# Patient Record
Sex: FEMALE | Race: BLACK OR AFRICAN AMERICAN | Employment: FULL TIME | URBAN - METROPOLITAN AREA
[De-identification: names, ages, dates, MRNs, and addresses within clinical notes are randomized per-mention and may not be internally consistent; named-entity substitution may affect disease eponyms.]

---

## 2017-02-20 ENCOUNTER — HOSPITAL ENCOUNTER (OUTPATIENT)
Dept: MAMMOGRAPHY | Age: 42
Discharge: HOME | End: 2017-02-20
Attending: OBSTETRICS & GYNECOLOGY
Payer: COMMERCIAL

## 2017-02-20 DIAGNOSIS — Z12.31 VISIT FOR SCREENING MAMMOGRAM: ICD-10-CM

## 2017-02-20 PROCEDURE — 77067 SCR MAMMO BI INCL CAD: CPT

## 2017-04-18 ENCOUNTER — OFFICE VISIT (OUTPATIENT)
Dept: INTERNAL MEDICINE CLINIC | Age: 42
End: 2017-04-18

## 2017-04-18 VITALS
OXYGEN SATURATION: 96 % | SYSTOLIC BLOOD PRESSURE: 117 MMHG | TEMPERATURE: 98.5 F | HEART RATE: 84 BPM | BODY MASS INDEX: 33.9 KG/M2 | WEIGHT: 216 LBS | RESPIRATION RATE: 16 BRPM | DIASTOLIC BLOOD PRESSURE: 80 MMHG | HEIGHT: 67 IN

## 2017-04-18 DIAGNOSIS — E04.1 THYROID NODULE: ICD-10-CM

## 2017-04-18 DIAGNOSIS — K21.9 GASTROESOPHAGEAL REFLUX DISEASE WITHOUT ESOPHAGITIS: Primary | ICD-10-CM

## 2017-04-18 DIAGNOSIS — G43.009 MIGRAINE WITHOUT AURA AND WITHOUT STATUS MIGRAINOSUS, NOT INTRACTABLE: ICD-10-CM

## 2017-04-18 DIAGNOSIS — M41.9 SCOLIOSIS OF THORACIC SPINE, UNSPECIFIED SCOLIOSIS TYPE: ICD-10-CM

## 2017-04-18 DIAGNOSIS — E66.9 OBESITY (BMI 30-39.9): ICD-10-CM

## 2017-04-18 RX ORDER — SERTRALINE HYDROCHLORIDE 100 MG/1
TABLET, FILM COATED ORAL
Qty: 90 TAB | Refills: 11 | Status: SHIPPED | OUTPATIENT
Start: 2017-04-18 | End: 2017-04-21 | Stop reason: SDUPTHER

## 2017-04-18 RX ORDER — KETOROLAC TROMETHAMINE 10 MG/1
TABLET, FILM COATED ORAL
Refills: 0 | COMMUNITY
Start: 2017-01-30 | End: 2017-04-18 | Stop reason: ALTCHOICE

## 2017-04-18 RX ORDER — SUMATRIPTAN 50 MG/1
TABLET, FILM COATED ORAL
Refills: 3 | COMMUNITY
Start: 2017-03-30 | End: 2018-09-07 | Stop reason: SDUPTHER

## 2017-04-18 RX ORDER — TRAMADOL HYDROCHLORIDE 50 MG/1
50 TABLET ORAL
Qty: 60 TAB | Refills: 2 | Status: SHIPPED | OUTPATIENT
Start: 2017-04-18 | End: 2017-05-23

## 2017-04-18 RX ORDER — DILTIAZEM HYDROCHLORIDE 240 MG/1
CAPSULE, EXTENDED RELEASE ORAL
Refills: 3 | COMMUNITY
Start: 2017-04-03 | End: 2018-05-29

## 2017-04-18 RX ORDER — BUTALBITAL, ACETAMINOPHEN AND CAFFEINE 50; 325; 40 MG/1; MG/1; MG/1
TABLET ORAL
Refills: 0 | COMMUNITY
Start: 2017-01-30 | End: 2017-04-18 | Stop reason: ALTCHOICE

## 2017-04-18 RX ORDER — OMEPRAZOLE 40 MG/1
CAPSULE, DELAYED RELEASE ORAL
Refills: 3 | COMMUNITY
Start: 2017-04-02 | End: 2018-07-18

## 2017-04-18 RX ORDER — PHENDIMETRAZINE TARTRATE 105 MG/1
CAPSULE, EXTENDED RELEASE ORAL
Refills: 0 | COMMUNITY
Start: 2017-01-30 | End: 2017-04-18 | Stop reason: CLARIF

## 2017-04-18 NOTE — MR AVS SNAPSHOT
Visit Information Date & Time Provider Department Dept. Phone Encounter #  
 4/18/2017 11:45 AM Raj Daniel MD [facility] [phone] [id number] Follow-up Instructions Return in about 1 year (around 4/18/2018), or if symptoms worsen or fail to improve. Follow-up and Disposition History Upcoming Health Maintenance Date Due DTaP/Tdap/Td series (1 - Tdap) 7/7/1996 INFLUENZA AGE 9 TO ADULT 8/1/2016 PAP AKA CERVICAL CYTOLOGY 5/20/2017 Allergies as of 4/18/2017  Review Complete On: 4/18/2017 By: Raj Daniel MD  
  
 Severity Noted Reaction Type Reactions Pertussis Vaccines High 05/10/2010    Other (comments) Shortness of breathe Mouth Cleanser [Carbamide Peroxide]  05/10/2010    Hives  
 glyoxide Pineapple  05/10/2010    Hives Rondec [Brompheniramine-pseudoephedrin]  05/10/2010    Hives Shellfish Containing Products  05/10/2010    Hives Tetracycline  05/10/2010    Hives Current Immunizations  Reviewed on 1/17/2013 Name Date Influenza Vaccine Whole 11/6/2012 Not reviewed this visit You Were Diagnosed With   
  
 Codes Comments Gastroesophageal reflux disease without esophagitis    -  Primary ICD-10-CM: K21.9 ICD-9-CM: 530.81 Thyroid nodule     ICD-10-CM: E04.1 ICD-9-CM: 241.0 Obesity (BMI 30-39. 9)     ICD-10-CM: E66.9 ICD-9-CM: 278.00 Scoliosis of thoracic spine, unspecified scoliosis type     ICD-10-CM: M41.9 ICD-9-CM: 737.30 Migraine without aura and without status migrainosus, not intractable     ICD-10-CM: R00.074 ICD-9-CM: 346.10 Vitals BP Pulse Temp Resp Height(growth percentile) Weight(growth percentile) 117/80 (BP 1 Location: Right arm, BP Patient Position: Sitting) 84 98.5 °F (36.9 °C) (Oral) 16 5' 7\" (1.702 m) 216 lb (98 kg) SpO2 BMI OB Status Smoking Status 96% 33.83 kg/m2 Having regular periods Never Smoker BMI and BSA Data Body Mass Index Body Surface Area  
 33.83 kg/m 2 2.15 m 2 Preferred Pharmacy Pharmacy Name Phone  [facility] [phone] Your Updated Medication List  
  
   
This list is accurate as of: 4/18/17  1:34 PM.  Always use your most recent med list.  
  
  
  
  
 CARTIA  mg ER capsule Generic drug:  dilTIAZem CD TK 1 C QD  
  
 omeprazole 40 mg capsule Commonly known as:  PRILOSEC TK 1 C PO D  
  
 OTHER Cardizem unsure of dosage PEPCID PO Take  by mouth. sertraline 100 mg tablet Commonly known as:  ZOLOFT Take 1 tab daily SUMAtriptan 50 mg tablet Commonly known as:  IMITREX TK 1 T PO BID PRN  
  
 traMADol 50 mg tablet Commonly known as:  ULTRAM  
Take 1 Tab by mouth every six (6) hours as needed for Pain. Max Daily Amount: 200 mg. Prescriptions Printed Refills  
 traMADol (ULTRAM) 50 mg tablet 2 Sig: Take 1 Tab by mouth every six (6) hours as needed for Pain. Max Daily Amount: 200 mg. Class: Print Route: Oral  
  
Prescriptions Sent to Pharmacy Refills  
 sertraline (ZOLOFT) 100 mg tablet 11 Sig: Take 1 tab daily Class: Normal  
 Pharmacy: [facility] [address] Ph #: [phone] We Performed the Following CBC WITH AUTOMATED DIFF [02651 CPT(R)] HEMOGLOBIN A1C WITH EAG [80763 CPT(R)] LIPID PANEL [07556 CPT(R)] METABOLIC PANEL, COMPREHENSIVE [13467 CPT(R)] PAIN MGMT PANEL W/REFL, UR [[id number] Custom] MS COLLECTION VENOUS BLOOD,VENIPUNCTURE [id number] CPT(R)] TSH 3RD GENERATION [71114 CPT(R)] URINALYSIS W/ RFLX MICROSCOPIC [68944 CPT(R)] Follow-up Instructions Return in about 1 year (around 4/18/2018), or if symptoms worsen or fail to improve. Introducing \[facility]\"" & HEALTH SERVICES!    
 [employer] introduces [employer] patient portal. Now you can access parts of your medical record, email your doctor's office, and request medication refills online. 1. In your internet browser, go to [email or web address] 2. Click on the First Time User? Click Here link in the Sign In box. You will see the New Member Sign Up page. 3. Enter your [employer] Access Code exactly as it appears below. You will not need to use this code after youve completed the sign-up process. If you do not sign up before the expiration date, you must request a new code. · [employer] Access Code: [email or web address] Expires: 7/17/2017  1:34 PM 
 
4. Enter the last four digits of your Social Security Number (xxxx) and Date of Birth (mm/dd/yyyy) as indicated and click Submit. You will be taken to the next sign-up page. 5. Create a [employer] ID. This will be your [employer] login ID and cannot be changed, so think of one that is secure and easy to remember. 6. Create a [employer] password. You can change your password at any time. 7. Enter your Password Reset Question and Answer. This can be used at a later time if you forget your password. 8. Enter your e-mail address. You will receive e-mail notification when new information is available in [address] 9. Click Sign Up. You can now view and download portions of your medical record. 10. Click the Download Summary menu link to download a portable copy of your medical information. If you have questions, please visit the Frequently Asked Questions section of the [employer] website. Remember, [employer] is NOT to be used for urgent needs. For medical emergencies, dial 911. Now available from your iPhone and Android! Please provide this summary of care documentation to your next provider. Your primary care clinician is listed as Deetta Spurling. If you have any questions after today's visit, please call [phone].

## 2017-04-18 NOTE — PROGRESS NOTES
1. Have you been to the ER, urgent care clinic since your last visit? Hospitalized since your last visit? No    2. Have you seen or consulted any other health care providers outside of the [facility] since your last visit? Include any pap smears or colon screening.  No

## 2017-04-18 NOTE — PROGRESS NOTES
SPORTS MEDICINE AND PRIMARY CARE  Sagar Boyce MD, [address] [place] [address],[address] [address]  Phone:  [phone]  Fax: [phone]      Chief Complaint   Patient presents with    Follow-up     2 month visit      +++    SUBECTIVE:    Yeimy Fernandez is a 39 y.o. female Patient returns today ambulatory, alert and appropriate and has the capacity to give an accurate history. She has a known history of GERD, asthma, migraines, fibroids, and obesity. Patient returns today voicing no new complaints. Since she was last seen she had surgery on 10/25/15 by Dr. Joy Buenrostro at [facility] and was 276 before surgery and got down to 216. She plans to lose some more but she is very pleased with the weight loss and feels better. She complains of low back pain as well as upper back pain and feels it is related to the heaviness of her breasts. There is no radiation of the discomfort. She continues with her headaches which she states \"Is normal.\"  Patient is seen for evaluation. Current Outpatient Prescriptions   Medication Sig Dispense Refill    CARTIA  mg ER capsule TK 1 C QD  3    sertraline (ZOLOFT) 100 mg tablet Take 1 tab daily 90 Tab 11    traMADol (ULTRAM) 50 mg tablet Take 1 Tab by mouth every six (6) hours as needed for Pain. Max Daily Amount: 200 mg. 60 Tab 2    FAMOTIDINE (PEPCID PO) Take  by mouth.       omeprazole (PRILOSEC) 40 mg capsule TK 1 C PO D  3    SUMAtriptan (IMITREX) 50 mg tablet TK 1 T PO BID PRN  3    OTHER Cardizem unsure of dosage       Past Medical History:   Diagnosis Date    Abnormal Pap smear     2006-recheck and was normal    Asthma     Asthma     Proventil inhaler PRN    Endometriosis     Family history of ovarian cancer 5/1/2014    Fibroids     GERD (gastroesophageal reflux disease)     reflux    Heart abnormalities     heart mumor     Hx of gastric bypass 10/25/2016    sleeve [id number]    HX OTHER MEDICAL     migraines    Migraines 2011    Obesity (BMI 30-39. 9)     Scoliosis of thoracic spine 2010    mri    Thyroid nodule     migraines     Past Surgical History:   Procedure Laterality Date    HX CHOLECYSTECTOMY  2013    [facility] kevin [facility] HX GI      HX MYOMECTOMY  5/17/10    HX OTHER SURGICAL      pilonidal cyst    HX OTHER SURGICAL      tonsillectomy    HX OTHER SURGICAL      cyst removed lip    HX OTHER SURGICAL      fibroids removed    MYOMECTOMY 1-4,W/TOT 250GMS/<,ABD APPRCH  5/17/10     Allergies   Allergen Reactions    Pertussis Vaccines Other (comments)     Shortness of breathe    Mouth Cleanser [Carbamide Peroxide] Hives     glyoxide    Pineapple Hives    Rondec [Brompheniramine-Pseudoephedrin] Hives    Shellfish Containing Products Hives    Tetracycline Hives       REVIEW OF SYSTEMS:   No chest pain. No shortness of breath. Social History     Social History    Marital status:      Spouse name: N/A    Number of children: N/A    Years of education: N/A     Social History Main Topics    Smoking status: Never Smoker    Smokeless tobacco: Never Used    Alcohol use No    Drug use: No    Sexual activity: Yes     Partners: Male     Birth control/ protection: None     Other Topics Concern    None     Social History Narrative    None   r  Family History   Problem Relation Age of Onset    Heart Disease Mother     Cancer Mother      ovarian cancer    Breast Cancer Mother    Habits:  She is a never smoker. Never drinker, and does not abuse drugs. Social History:  The patient is  to her second  of four years duration. She has one child who is [de-identified] years old, a son. 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 She was going to [employer] but she is considering going to [employer]. Family History:  Father is alive and well.   Mother  at age 64 of ventricular atrial fibrillation. Other medical problems included end stage renal disease on dialysis, diabetes, heart disease, obesity. She has two sisters, alive and well. OBJECTIVE:  Visit Vitals    /80 (BP 1 Location: Right arm, BP Patient Position: Sitting)    Pulse 84    Temp 98.5 °F (36.9 °C) (Oral)    Resp 16    Ht 5' 7\" (1.702 m)    Wt 216 lb (98 kg)    SpO2 96%    BMI 33.83 kg/m2     ENT: perrla,  eom intact  NECK: supple. Thyroid normal  CHEST: clear to ascultation and percussion   HEART: regular rate and rhythm  ABD: soft, bowel sounds active  EXTREMITIES: no edema, pulse 1+     No visits with results within 3 Month(s) from this visit. Latest known visit with results is:    Office Visit on 06/02/2015   Component Date Value Ref Range Status    Specific Gravity 06/02/2015 1.022  1.005 - 1.030 Final    pH (UA) 06/02/2015 7.0  5.0 - 7.5 Final    Color 06/02/2015 Yellow  Yellow Final    Appearance 06/02/2015 Clear  Clear Final    Leukocyte Esterase 06/02/2015 Negative  Negative Final    Protein 06/02/2015 Negative  Negative/Trace Final    Glucose 06/02/2015 Negative  Negative Final    Ketone 06/02/2015 Negative  Negative Final    Blood 06/02/2015 Negative  Negative Final    Bilirubin 06/02/2015 Negative  Negative Final    Urobilinogen 06/02/2015 0.2  0.0 - 1.9 mg/dL Final    Nitrites 06/02/2015 Negative  Negative Final    Microscopic Examination 06/02/2015 Comment   Final    Microscopic not indicated and not performed.     Hemoglobin A1c 06/02/2015 6.0* 4.8 - 5.6 % Final    Comment:          Increased risk for diabetes: 5.7 - 6.4           Diabetes: >6.4           Glycemic control for adults with diabetes: <7.0      WBC 06/02/2015 6.3  3.4 - 10.8 x10E3/uL Final    RBC 06/02/2015 4.54  3.77 - 5.28 x10E6/uL Final    HGB 06/02/2015 13.0  11.1 - 15.9 g/dL Final    HCT 06/02/2015 39.6  34.0 - 46.6 % Final    MCV 06/02/2015 87  79 - 97 fL Final    MCH 06/02/2015 28.6  26.6 - 33.0 pg Final  MCHC 06/02/2015 32.8  31.5 - 35.7 g/dL Final    RDW 06/02/2015 13.9  12.3 - 15.4 % Final    PLATELET 28/60/0628 276  150 - 379 x10E3/uL Final    NEUTROPHILS 06/02/2015 51  % Final    Lymphocytes 06/02/2015 37  % Final    MONOCYTES 06/02/2015 9  % Final    EOSINOPHILS 06/02/2015 2  % Final    BASOPHILS 06/02/2015 1  % Final    ABS. NEUTROPHILS 06/02/2015 3.3  1.4 - 7.0 x10E3/uL Final    Abs Lymphocytes 06/02/2015 2.3  0.7 - 3.1 x10E3/uL Final    ABS. MONOCYTES 06/02/2015 0.5  0.1 - 0.9 x10E3/uL Final    ABS. EOSINOPHILS 06/02/2015 0.1  0.0 - 0.4 x10E3/uL Final    ABS. BASOPHILS 06/02/2015 0.0  0.0 - 0.2 x10E3/uL Final    IMMATURE GRANULOCYTES 06/02/2015 0  % Final    ABS. IMM. GRANS. 06/02/2015 0.0  0.0 - 0.1 x10E3/uL Final    Glucose 06/02/2015 80  65 - 99 mg/dL Final    BUN 06/02/2015 10  6 - 20 mg/dL Final    Creatinine 06/02/2015 0.72  0.57 - 1.00 mg/dL Final    GFR est non-AA 06/02/2015 106  >59 mL/min/1.73 Final    GFR est AA 06/02/2015 122  >59 mL/min/1.73 Final    BUN/Creatinine ratio 06/02/2015 14  8 - 20 Final    Sodium 06/02/2015 138  134 - 144 mmol/L Final    Potassium 06/02/2015 4.9  3.5 - 5.2 mmol/L Final    Chloride 06/02/2015 103  97 - 108 mmol/L Final    CO2 06/02/2015 20  18 - 29 mmol/L Final    Calcium 06/02/2015 9.2  8.7 - 10.2 mg/dL Final    Protein, total 06/02/2015 7.3  6.0 - 8.5 g/dL Final    Albumin 06/02/2015 4.5  3.5 - 5.5 g/dL Final    GLOBULIN, TOTAL 06/02/2015 2.8  1.5 - 4.5 g/dL Final    A-G Ratio 06/02/2015 1.6  1.1 - 2.5 Final    Bilirubin, total 06/02/2015 0.2  0.0 - 1.2 mg/dL Final    Alk.  phosphatase 06/02/2015 88  39 - 117 IU/L Final    AST (SGOT) 06/02/2015 16  0 - 40 IU/L Final    ALT (SGPT) 06/02/2015 13  0 - 32 IU/L Final    Cholesterol, total 06/02/2015 209* 100 - 199 mg/dL Final    Triglyceride 06/02/2015 169* 0 - 149 mg/dL Final    HDL Cholesterol 06/02/2015 55  >39 mg/dL Final    Comment: According to ATP-III Guidelines, HDL-C >59 mg/dL is considered a  negative risk factor for CHD.  VLDL, calculated 06/02/2015 34  5 - 40 mg/dL Final    LDL, calculated 06/02/2015 120* 0 - 99 mg/dL Final    TSH 06/02/2015 1.850  0.450 - 4.500 uIU/mL Final    Microalbumin, urine 06/02/2015 12.1  0.0 - 17.0 ug/mL Final          ASSESSMENT:  1. Gastroesophageal reflux disease without esophagitis    2. Thyroid nodule    3. Obesity (BMI 30-39.9)    4. Scoliosis of thoracic spine, unspecified scoliosis type    5. Migraine without aura and without status migrainosus, not intractable      Patient's medical progress is excellent. Her weight loss is tremendous. We would like to see her continue with the weight loss and dietary manipulation now. We certainly encourage physical activity for at least 30 minutes five days a week but I am not sure she can fit that into her busy schedule. We will obtain appropriate laboratory studies. The thyroid nodules are more prominent on the right than left. Ultrasound was done about two years ago and will not be done at this time. They do not feel like they have changed significantly in size. they have changed. She will return to see us in about a year, sooner if she has any problems. Renew medications as listed. Patient is requesting something for pain. Tylenol is not effective for the discomfort. She is unable to take NSAID's or aspirin because of the gastric sleeve procedure and her gastroesophageal reflux disease. She does not want to take something that is going to make loopy and therefore she is relegated to Tramadol. She is aware of the rules regarding Tramadol and narcotics. Patient is taking Imitrex and is advised of the interaction of Imitrex and Tramadol that could produce a serotonin syndrome. We advise her of the symptoms of the serotonin syndrome and that it could result in death and could result in the risk of seizures. She says she has taken both before together.   We advise her that she should not take either one within the same week. She is agreeable to this. PLAN:  .  Orders Placed This Encounter    URINALYSIS W/ RFLX MICROSCOPIC    CBC WITH AUTOMATED DIFF    METABOLIC PANEL, COMPREHENSIVE    LIPID PANEL    HEMOGLOBIN A1C WITH EAG    TSH 3RD GENERATION    PAIN MGMT PANEL W/REFL, UR    DISCONTD: butalbital-acetaminophen-caffeine (FIORICET, ESGIC) -40 mg per tablet    CARTIA  mg ER capsule    DISCONTD: ketorolac (TORADOL) 10 mg tablet    omeprazole (PRILOSEC) 40 mg capsule    DISCONTD: phendimetrazine tartrate 105 mg cpER    SUMAtriptan (IMITREX) 50 mg tablet    sertraline (ZOLOFT) 100 mg tablet    traMADol (ULTRAM) 50 mg tablet       Follow-up Disposition:  Return in about 1 year (around 4/18/2018), or if symptoms worsen or fail to improve. ATTENTION:   This medical record was transcribed using an electronic medical records system. Although proofread, it may and can contain electronic and spelling errors. Other human spelling and other errors may be present. Corrections may be executed at a later time. Please feel free to contact us for any clarifications as needed.

## 2017-04-19 LAB
ALBUMIN SERPL-MCNC: 4.2 G/DL (ref 3.5–5.5)
ALBUMIN/GLOB SERPL: 1.4 {RATIO} (ref 1.2–2.2)
ALP SERPL-CCNC: 55 IU/L (ref 39–117)
ALT SERPL-CCNC: 15 IU/L (ref 0–32)
AMPHETAMINES UR QL SCN: NEGATIVE NG/ML
APPEARANCE UR: CLEAR
AST SERPL-CCNC: 18 IU/L (ref 0–40)
BARBITURATES UR QL SCN: NEGATIVE NG/ML
BASOPHILS # BLD AUTO: 0 X10E3/UL (ref 0–0.2)
BASOPHILS NFR BLD AUTO: 1 %
BENZODIAZ UR QL SCN: NEGATIVE NG/ML
BILIRUB SERPL-MCNC: 0.2 MG/DL (ref 0–1.2)
BILIRUB UR QL STRIP: NEGATIVE
BUN SERPL-MCNC: 14 MG/DL (ref 6–24)
BUN/CREAT SERPL: 23 (ref 9–23)
BZE UR QL SCN: NEGATIVE NG/ML
CALCIUM SERPL-MCNC: 9.5 MG/DL (ref 8.7–10.2)
CANNABINOIDS UR QL SCN: NEGATIVE NG/ML
CHLORIDE SERPL-SCNC: 100 MMOL/L (ref 96–106)
CHOLEST SERPL-MCNC: 195 MG/DL (ref 100–199)
CO2 SERPL-SCNC: 22 MMOL/L (ref 18–29)
COLOR UR: YELLOW
CREAT SERPL-MCNC: 0.61 MG/DL (ref 0.57–1)
CREAT UR-MCNC: 119.4 MG/DL (ref 20–300)
EOSINOPHIL # BLD AUTO: 0 X10E3/UL (ref 0–0.4)
EOSINOPHIL NFR BLD AUTO: 1 %
ERYTHROCYTE [DISTWIDTH] IN BLOOD BY AUTOMATED COUNT: 13.7 % (ref 12.3–15.4)
EST. AVERAGE GLUCOSE BLD GHB EST-MCNC: 111 MG/DL
FENTANYL+NORFENTANYL UR QL SCN: NEGATIVE PG/ML
GLOBULIN SER CALC-MCNC: 2.9 G/DL (ref 1.5–4.5)
GLUCOSE SERPL-MCNC: 78 MG/DL (ref 65–99)
GLUCOSE UR QL: NEGATIVE
HBA1C MFR BLD: 5.5 % (ref 4.8–5.6)
HCT VFR BLD AUTO: 38.2 % (ref 34–46.6)
HDLC SERPL-MCNC: 64 MG/DL
HGB BLD-MCNC: 12.6 G/DL (ref 11.1–15.9)
HGB UR QL STRIP: NEGATIVE
IMM GRANULOCYTES # BLD: 0 X10E3/UL (ref 0–0.1)
IMM GRANULOCYTES NFR BLD: 0 %
KETONES UR QL STRIP: NEGATIVE
LDLC SERPL CALC-MCNC: 110 MG/DL (ref 0–99)
LEUKOCYTE ESTERASE UR QL STRIP: NEGATIVE
LYMPHOCYTES # BLD AUTO: 2.1 X10E3/UL (ref 0.7–3.1)
LYMPHOCYTES NFR BLD AUTO: 39 %
MCH RBC QN AUTO: 30 PG (ref 26.6–33)
MCHC RBC AUTO-ENTMCNC: 33 G/DL (ref 31.5–35.7)
MCV RBC AUTO: 91 FL (ref 79–97)
MEPERIDINE UR QL: NEGATIVE NG/ML
METHADONE UR QL SCN: NEGATIVE NG/ML
MICRO URNS: NORMAL
MONOCYTES # BLD AUTO: 0.4 X10E3/UL (ref 0.1–0.9)
MONOCYTES NFR BLD AUTO: 7 %
NEUTROPHILS # BLD AUTO: 2.8 X10E3/UL (ref 1.4–7)
NEUTROPHILS NFR BLD AUTO: 52 %
NITRITE UR QL STRIP: NEGATIVE
OPIATES UR QL SCN: NEGATIVE NG/ML
OXYCODONE+OXYMORPHONE UR QL SCN: NEGATIVE NG/ML
PCP UR QL: NEGATIVE NG/ML
PH UR STRIP: 7 [PH] (ref 5–7.5)
PH UR: 6.9 [PH] (ref 4.5–8.9)
PLATELET # BLD AUTO: 317 X10E3/UL (ref 150–379)
PLEASE NOTE:, 733157: NORMAL
POTASSIUM SERPL-SCNC: 4.6 MMOL/L (ref 3.5–5.2)
PROPOXYPH UR QL SCN: NEGATIVE NG/ML
PROT SERPL-MCNC: 7.1 G/DL (ref 6–8.5)
PROT UR QL STRIP: NEGATIVE
RBC # BLD AUTO: 4.2 X10E6/UL (ref 3.77–5.28)
SODIUM SERPL-SCNC: 137 MMOL/L (ref 134–144)
SP GR UR: 1.02
SP GR UR: 1.02 (ref 1–1.03)
TRAMADOL UR QL SCN: NEGATIVE NG/ML
TRIGL SERPL-MCNC: 106 MG/DL (ref 0–149)
TSH SERPL DL<=0.005 MIU/L-ACNC: 1.1 UIU/ML (ref 0.45–4.5)
UROBILINOGEN UR STRIP-MCNC: 0.2 MG/DL (ref 0.2–1)
VLDLC SERPL CALC-MCNC: 21 MG/DL (ref 5–40)
WBC # BLD AUTO: 5.3 X10E3/UL (ref 3.4–10.8)

## 2017-04-21 RX ORDER — SERTRALINE HYDROCHLORIDE 100 MG/1
TABLET, FILM COATED ORAL
Qty: 90 TAB | Refills: 3 | Status: SHIPPED | OUTPATIENT
Start: 2017-04-21 | End: 2018-05-29 | Stop reason: SDUPTHER

## 2017-05-23 ENCOUNTER — HOSPITAL ENCOUNTER (OUTPATIENT)
Dept: LAB | Age: 42
Discharge: HOME | End: 2017-05-23
Payer: COMMERCIAL

## 2017-05-23 ENCOUNTER — OFFICE VISIT (OUTPATIENT)
Dept: SURGERY | Age: 42
End: 2017-05-23

## 2017-05-23 VITALS
DIASTOLIC BLOOD PRESSURE: 77 MMHG | OXYGEN SATURATION: 99 % | BODY MASS INDEX: 33.78 KG/M2 | HEIGHT: 67 IN | HEART RATE: 88 BPM | TEMPERATURE: 98 F | SYSTOLIC BLOOD PRESSURE: 128 MMHG | WEIGHT: 215.2 LBS

## 2017-05-23 DIAGNOSIS — Z98.891 S/P CESAREAN SECTION: ICD-10-CM

## 2017-05-23 DIAGNOSIS — Z01.419 ENCOUNTER FOR ROUTINE GYNECOLOGICAL EXAMINATION WITH PAPANICOLAOU SMEAR OF CERVIX: Primary | ICD-10-CM

## 2017-05-23 DIAGNOSIS — Z80.41 FAMILY HISTORY OF OVARIAN CANCER: ICD-10-CM

## 2017-05-23 DIAGNOSIS — Z98.890 HISTORY OF MYOMECTOMY: ICD-10-CM

## 2017-05-23 PROCEDURE — 88142 CYTOPATH C/V THIN LAYER: CPT | Performed by: OBSTETRICS & GYNECOLOGY

## 2017-05-23 RX ORDER — LEVONORGESTREL AND ETHINYL ESTRADIOL 0.15-0.03
1 KIT ORAL DAILY
Qty: 3 PACKAGE | Refills: 4 | Status: SHIPPED | OUTPATIENT
Start: 2017-05-23 | End: 2018-05-29 | Stop reason: SDUPTHER

## 2017-05-23 NOTE — PROGRESS NOTES
SUBJECTIVE: Ayaz Garsia is a 39 y.o. female who presents with desire for annual well woman exam. Patient's last menstrual period was 2017. Had  2013 and recurrence of fibroids noted, there were 7 or 8 seen at that time. Menses heavy with large clots. Long hx of endometriosis. Her pelvic stopped for past 2 months and then came back 4 days ago and now 8/10 pain score. Past Medical History:   Diagnosis Date    Abnormal Pap smear     -recheck and was normal    Asthma     Asthma     Proventil inhaler PRN    Endometriosis     Family history of ovarian cancer 2014    Fibroids     GERD (gastroesophageal reflux disease)     reflux    Heart abnormalities     heart mumor     Hx of gastric bypass 10/25/2016    sleeve [id number]    HX OTHER MEDICAL     migraines    Migraines 2011    Obesity (BMI 30-39. 9)     Scoliosis of thoracic spine 2010    mri    Thyroid nodule     migraines     Past Surgical History:   Procedure Laterality Date    HX CHOLECYSTECTOMY  2013    [facility] kevin at [facility] HX GI      HX MYOMECTOMY  5/17/10    HX OTHER SURGICAL      pilonidal cyst    HX OTHER SURGICAL      tonsillectomy    HX OTHER SURGICAL      cyst removed lip    HX OTHER SURGICAL      fibroids removed    MYOMECTOMY 1-4,W/TOT 250GMS/<,ABD APPRCH  5/17/10     OB History      Para Term  AB TAB SAB Ectopic Multiple Living    1 1 1 0 0 0 0 0 0 1        Family History   Problem Relation Age of Onset    Heart Disease Mother     Cancer Mother      ovarian cancer    Breast Cancer Mother      Social History     Social History    Marital status:      Spouse name: N/A    Number of children: N/A    Years of education: N/A     Occupational History    Not on file.      Social History Main Topics    Smoking status: Never Smoker    Smokeless tobacco: Never Used    Alcohol use No    Drug use: No    Sexual activity: Yes     Partners: Male Birth control/ protection: None     Other Topics Concern    Not on file     Social History Narrative    Habits:  She is a never smoker. Never drinker, and does not abuse drugs. Social History:  The patient is  to her second  of four years duration. She has one child who is [de-identified] years old, a son. 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 She was going to [employer] but she is considering going to [employer]. Family History:  Father is alive and well. Mother  at age 64 of ventricular atrial fibrillation. Other medical problems included end stage renal disease on dialysis, diabetes, heart disease, obesity. She has two sisters, alive and well. Current Outpatient Prescriptions   Medication Sig Dispense Refill    sertraline (ZOLOFT) 100 mg tablet Take 1 tab daily 90 Tab 3    CARTIA  mg ER capsule TK 1 C QD  3    omeprazole (PRILOSEC) 40 mg capsule TK 1 C PO D  3    SUMAtriptan (IMITREX) 50 mg tablet TK 1 T PO BID PRN  3    FAMOTIDINE (PEPCID PO) Take  by mouth.  OTHER Cardizem unsure of dosage         Review of Systems:   Constitutional: No weight change, chills or fever, anorexia, weakness or sleep disturbance . Cardiovascular: No chest pain, shortness of breath, or palpitations . Respiratory: No cough, shortness of breath, hemoptysis, or orthopnea . Neurologic: No syncope, headaches or seizures . Hematologic: No easy bruising or unusual bleeding . Psychiatric: No insomnia, confusion, depression, or anxiety . GI:No nausea and vomiting, diarrhea or constipation  . : See HPI . Musculoskeletal: No joint pain or muscle pain . Endocrine: No polydipsia, polyuria, cold intolerance, excessive fatigue, or sleep disturbance . Integumentary: No breast pain, lumps, nipple discharge, or axillary lumps .     Objective:     Visit Vitals    /77    Pulse 88    Temp 98 °F (36.7 °C) (Oral)    Ht 5' 7\" (1.702 m)    Wt 215 lb 3.2 oz (97.6 kg)    LMP 2017    SpO2 99%    BMI 33.71 kg/m2       General:  alert, cooperative, no distress, appears stated age   Skin:  no rash or abnormalities   Eyes: negative   Mouth: MMM no lesions   Lymph Nodes:  Cervical, supraclavicular, and axillary nodes normal.   Breast Exam: normal appearance, no masses or tenderness    Lungs:  clear to auscultation bilaterally   Heart:  regular rate and rhythm, S1, S2 normal, no murmur, click, rub or gallop   Abdomen: abnormal findings:  tenderness moderate in the RLQ   Back:  Costovertebral angle tenderness absent   Genitourinary: Pelvic exam: VULVA: normal appearing vulva with no masses, tenderness or lesions, VAGINA: normal appearing vagina with normal color and discharge, no lesions, CERVIX: normal appearing cervix without discharge or lesions, UTERUS: anteverted, enlarged to ? week's size, irregular, ADNEXA: tenderness right    Extremities:  extremities normal, atraumatic, no cyanosis or edema   Neurologic:  negative   Psychiatric:  non focal     ASSESSMENT:      ICD-10-CM ICD-9-CM    1. Encounter for routine gynecological examination with Papanicolaou smear of cervix Z01.419 V72.31 PAP, LB, RFX HPV FLACV([id number])     V76.2 levonorgestrel-ethinyl estradiol (ALTAVERA, 28,) 0.15-0.03 mg tab   2. History of myomectomy Z98.890 V45.89    3. S/P  section Z98.891 V45.89    4. Family history of ovarian cancer Z80.41 V16.41         Follow-up Disposition:  Return in about 1 year (around 2018), or if symptoms worsen or fail to improve.

## 2017-10-12 RX ORDER — TRAMADOL HYDROCHLORIDE 50 MG/1
TABLET ORAL
Qty: 60 TAB | Refills: 2 | Status: SHIPPED | OUTPATIENT
Start: 2017-10-12 | End: 2018-05-29 | Stop reason: ALTCHOICE

## 2017-10-13 ENCOUNTER — TELEPHONE (OUTPATIENT)
Dept: INTERNAL MEDICINE CLINIC | Age: 42
End: 2017-10-13

## 2018-02-27 ENCOUNTER — HOSPITAL ENCOUNTER (OUTPATIENT)
Dept: MAMMOGRAPHY | Age: 43
Discharge: HOME | End: 2018-02-27
Attending: OBSTETRICS & GYNECOLOGY
Payer: COMMERCIAL

## 2018-02-27 DIAGNOSIS — Z12.39 SCREENING BREAST EXAMINATION: ICD-10-CM

## 2018-02-27 PROCEDURE — 77067 SCR MAMMO BI INCL CAD: CPT

## 2018-05-29 ENCOUNTER — OFFICE VISIT (OUTPATIENT)
Dept: INTERNAL MEDICINE CLINIC | Age: 43
End: 2018-05-29

## 2018-05-29 VITALS
HEART RATE: 90 BPM | TEMPERATURE: 99.2 F | HEIGHT: 67 IN | BODY MASS INDEX: 32.83 KG/M2 | SYSTOLIC BLOOD PRESSURE: 100 MMHG | DIASTOLIC BLOOD PRESSURE: 68 MMHG | OXYGEN SATURATION: 98 % | WEIGHT: 209.2 LBS | RESPIRATION RATE: 18 BRPM

## 2018-05-29 DIAGNOSIS — E66.9 OBESITY (BMI 30-39.9): ICD-10-CM

## 2018-05-29 DIAGNOSIS — Z98.84 H/O GASTRIC BYPASS: ICD-10-CM

## 2018-05-29 DIAGNOSIS — F41.8 DEPRESSION WITH ANXIETY: Primary | ICD-10-CM

## 2018-05-29 DIAGNOSIS — Z01.419 ENCOUNTER FOR ROUTINE GYNECOLOGICAL EXAMINATION WITH PAPANICOLAOU SMEAR OF CERVIX: ICD-10-CM

## 2018-05-29 RX ORDER — SERTRALINE HYDROCHLORIDE 100 MG/1
TABLET, FILM COATED ORAL
Qty: 90 TAB | Refills: 3 | Status: SHIPPED | OUTPATIENT
Start: 2018-05-29 | End: 2019-06-12 | Stop reason: SDUPTHER

## 2018-05-29 RX ORDER — LEVONORGESTREL AND ETHINYL ESTRADIOL 0.15-0.03
1 KIT ORAL DAILY
Qty: 3 PACKAGE | Refills: 4 | Status: SHIPPED | OUTPATIENT
Start: 2018-05-29 | End: 2018-07-18 | Stop reason: SDUPTHER

## 2018-05-29 RX ORDER — ALPRAZOLAM 0.25 MG/1
0.25 TABLET ORAL
Qty: 60 TAB | Refills: 2 | Status: SHIPPED | OUTPATIENT
Start: 2018-05-29 | End: 2019-10-31 | Stop reason: SDUPTHER

## 2018-05-29 NOTE — PROGRESS NOTES
SPORTS MEDICINE AND PRIMARY CARE  Linus Sterling MD, [address] [facility],[address] [address]  Phone:  [phone]  Fax: [phone]       Chief Complaint   Patient presents with    Medication Refill   . SUBJECTIVE:    Chayo Gonzales is a 43 y.o. female Patient returns today after a gastric sleeve converted to a wu-en-y gastric bypass on 05/16/18 for severe reflux, to the point that she was sleeping on six pillows a night. She is now on three pillows, but is afraid to go flat because of concern for the uncomfortable burning sensation. As of today she's . She was knowing her  for 23 years,  for 5 years and they have a 11year old son. He's found another person in his life and she was helping to pay child support in the past for him. She's very anxious. She thinks he's going to try and take the son and also get out of child support. Other new complaints denied and patient is seen for evaluation. Current Outpatient Prescriptions   Medication Sig Dispense Refill    sertraline (ZOLOFT) 100 mg tablet Take 1 tab daily 90 Tab 3    levonorgestrel-ethinyl estradiol (ALTAVERA, 28,) 0.15-0.03 mg tab Take 1 Tab by mouth daily. 3 Package 4    ALPRAZolam (XANAX) 0.25 mg tablet Take 1 Tab by mouth two (2) times daily as needed for Anxiety. 60 Tab 2    omeprazole (PRILOSEC) 40 mg capsule TK 1 C PO D  3    SUMAtriptan (IMITREX) 50 mg tablet TK 1 T PO BID PRN  3    FAMOTIDINE (PEPCID PO) Take  by mouth.       OTHER Cardizem unsure of dosage       Past Medical History:   Diagnosis Date    Abnormal Pap smear     2006-recheck and was normal    Asthma     Asthma     Proventil inhaler PRN    Depression with anxiety 05/29/2018    Endometriosis     fredy valerio md    Family history of ovarian cancer 5/1/2014    Fibroids     GERD (gastroesophageal reflux disease)     reflux    H/O gastric bypass 05/16/2018    gastric bypass angel ewing [facility] - [facility]    Heart abnormalities     heart mumor     Hx of gastric bypass 10/21/2015    laparoscopic sleeve gastrectomy - inital weight 266 - Jos Ball md    HX OTHER MEDICAL     migraines    Migraines 6/29/2011    Obesity (BMI 30-39. 9)     Scoliosis of thoracic spine 03/01/2010    mri    Thyroid nodule     migraines     Past Surgical History:   Procedure Laterality Date    HX CHOLECYSTECTOMY  04/20/2013    lap kevin [facility] HX GI      HX MYOMECTOMY  5/17/10    HX OTHER SURGICAL      pilonidal cyst    HX OTHER SURGICAL      tonsillectomy    HX OTHER SURGICAL      cyst removed lip    HX OTHER SURGICAL      fibroids removed    MYOMECTOMY 1-4,W/TOT 250GMS/<,ABD [facility]  5/17/10     Allergies   Allergen Reactions    Pertussis Vaccines Other (comments)     Shortness of breathe    Mouth Cleanser [Carbamide Peroxide] Hives     glyoxide    Pineapple Hives    Rondec [Brompheniramine-Pseudoephedrin] Hives    Shellfish Containing Products Hives    Tetracycline Hives         REVIEW OF SYSTEMS:  General: negative for - chills or fever  ENT: negative for - headaches, nasal congestion or tinnitus  Respiratory: negative for - cough, hemoptysis, shortness of breath or wheezing  Cardiovascular : negative for - chest pain, edema, palpitations or shortness of breath  Gastrointestinal: negative for - abdominal pain, blood in stools, heartburn or nausea/vomiting  Genito-Urinary: no dysuria, trouble voiding, or hematuria  Musculoskeletal: negative for - gait disturbance, joint pain, joint stiffness or joint swelling  Neurological: no TIA or stroke symptoms  Hematologic: no bruises, no bleeding, no swollen glands  Integument: no lumps, mole changes, nail changes or rash  Endocrine: no malaise/lethargy or unexpected weight changes      Social History     Social History    Marital status:      Spouse name: N/A    Number of children: N/A    Years of education: N/A     Social History Main Topics    Smoking status: Never Smoker    Smokeless tobacco: Never Used    Alcohol use Yes      Comment: occasional    Drug use: No    Sexual activity: Not Currently     Partners: Male     Birth control/ protection: None     Other Topics Concern    None     Social History Narrative    Habits:  She is a never smoker. Never drinker, and does not abuse drugs. Social History:  The patient is  to her second  of 5 years duration and now    She has one child who is [de-identified] years old, a son. 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 She was going to [employer] but she is considering going to [employer]. Family History:  Father is alive and well. Mother  at age 64 of ventricular atrial fibrillation. Other medical problems included end stage renal disease on dialysis, diabetes, heart disease, obesity. She has two sisters, alive and well. Family History   Problem Relation Age of Onset    Heart Disease Mother     Cancer Mother      ovarian cancer    Breast Cancer Mother      ? age       OBJECTIVE:    Visit Vitals    /68    Pulse 90    Temp 99.2 °F (37.3 °C) (Oral)    Resp 18    Ht 5' 7\" (1.702 m)    Wt 209 lb 3.2 oz (94.9 kg)    SpO2 98%    BMI 32.77 kg/m2     CONSTITUTIONAL: well , well nourished, appears age appropriate  EYES: perrla, eom intact  ENMT:moist mucous membranes, pharynx clear  NECK: supple. Thyroid normal  RESPIRATORY: Chest: clear bilaterally   CARDIOVASCULAR: Heart: regular rate and rhythm  GASTROINTESTINAL: Abdomen: soft, bowel sounds active  HEMATOLOGIC: no pathological lymph nodes palpated  MUSCULOSKELETAL: Extremities: no edema, pulse 1+   INTEGUMENT: No unusual rashes or suspicious skin lesions noted. Nails appear normal.  NEUROLOGIC: non-focal exam   MENTAL STATUS: alert and oriented, appropriate affect           ASSESSMENT:  1. Depression with anxiety    2.  Encounter for routine gynecological examination with Papanicolaou smear of cervix    3. H/O gastric bypass    4. Obesity (BMI 30-39. 9)      Patient with layers of situational issues, for which Xanax would be appropriate. She's on Zoloft. We remind her of the addictive potential and she assures me that won't happen. She is a nurse. We give her encouragement in the days ahead. She's going to have her son play with his cousins, who are boys, as she wants him to have some male influence in his life. She'll return to the office in 3-4 months or as needed. Discussed the patient's BMI with her. The BMI follow up plan is as follows:     dietary management education, guidance, and counseling  encourage exercise  monitor weight  prescribed dietary intake    An After Visit Summary was printed and given to the patient  PLAN:  .  Orders Placed This Encounter    sertraline (ZOLOFT) 100 mg tablet    levonorgestrel-ethinyl estradiol (ALTAVERA, 28,) 0.15-0.03 mg tab    ALPRAZolam (XANAX) 0.25 mg tablet       Follow-up Disposition:  Return in about 4 months (around 9/29/2018), or if symptoms worsen or fail to improve. ATTENTION:   This medical record was transcribed using an electronic medical records system. Although proofread, it may and can contain electronic and spelling errors. Other human spelling and other errors may be present. Corrections may be executed at a later time. Please feel free to contact us for any clarifications as needed. Charis El

## 2018-05-29 NOTE — PATIENT INSTRUCTIONS
Body Mass Index: Care Instructions  Your Care Instructions    Body mass index (BMI) can help you see if your weight is raising your risk for health problems. It uses a formula to compare how much you weigh with how tall you are. · A BMI lower than 18.5 is considered underweight. · A BMI between 18.5 and 24.9 is considered healthy. · A BMI between 25 and 29.9 is considered overweight. A BMI of 30 or higher is considered obese. If your BMI is in the normal range, it means that you have a lower risk for weight-related health problems. If your BMI is in the overweight or obese range, you may be at increased risk for weight-related health problems, such as high blood pressure, heart disease, stroke, arthritis or joint pain, and diabetes. If your BMI is in the underweight range, you may be at increased risk for health problems such as fatigue, lower protection (immunity) against illness, muscle loss, bone loss, hair loss, and hormone problems. BMI is just one measure of your risk for weight-related health problems. You may be at higher risk for health problems if you are not active, you eat an unhealthy diet, or you drink too much alcohol or use tobacco products. Follow-up care is a key part of your treatment and safety. Be sure to make and go to all appointments, and call your doctor if you are having problems. It's also a good idea to know your test results and keep a list of the medicines you take. How can you care for yourself at home? · Practice healthy eating habits. This includes eating plenty of fruits, vegetables, whole grains, lean protein, and low-fat dairy. · If your doctor recommends it, get more exercise. Walking is a good choice. Bit by bit, increase the amount you walk every day. Try for at least 30 minutes on most days of the week. · Do not smoke. Smoking can increase your risk for health problems. If you need help quitting, talk to your doctor about stop-smoking programs and medicines. These can increase your chances of quitting for good. · Limit alcohol to 2 drinks a day for men and 1 drink a day for women. Too much alcohol can cause health problems. If you have a BMI higher than 25  · Your doctor may do other tests to check your risk for weight-related health problems. This may include measuring the distance around your waist. A waist measurement of more than 40 inches in men or 35 inches in women can increase the risk of weight-related health problems. · Talk with your doctor about steps you can take to stay healthy or improve your health. You may need to make lifestyle changes to lose weight and stay healthy, such as changing your diet and getting regular exercise. If you have a BMI lower than 18.5  · Your doctor may do other tests to check your risk for health problems. · Talk with your doctor about steps you can take to stay healthy or improve your health. You may need to make lifestyle changes to gain or maintain weight and stay healthy, such as getting more healthy foods in your diet and doing exercises to build muscle. Where can you learn more? Go to http://yung-johan.info/. Enter S176 in the search box to learn more about \"Body Mass Index: Care Instructions. \"  Current as of: October 13, 2016  Content Version: 11.4  © 8094-7907 Healthwise, Incorporated. Care instructions adapted under license by [employer] (which disclaims liability or warranty for this information). If you have questions about a medical condition or this instruction, always ask your healthcare professional. [address] any warranty or liability for your use of this information.

## 2018-05-29 NOTE — MR AVS SNAPSHOT
[address] [facility] 
 
 
 [address] [address] 
[phone] Patient: Terressa Paget MRN: [id number] Visit Information Date & Time Provider Department Dept. Phone Encounter #  
 5/29/2018 12:00 PM Marco Beebe MD [facility] Sports Medicine and Primary Care [phone] [id number] Follow-up Instructions Return in about 4 months (around 9/29/2018), or if symptoms worsen or fail to improve. Follow-up and Disposition History Your Appointments 9/7/2018 12:00 PM  
Any with Marco Beebe MD  
[address] [place] [address] and Primary Care [address] [facility]) Appt Note: 3 MONTH FOLLOW UP  
 Yolande Gunn [address] [facility]  
  
   
 [address] [address] Upcoming Health Maintenance Date Due DTaP/Tdap/Td series (1 - Tdap) 5/29/2019* Influenza Age 5 to Adult 8/1/2018 PAP AKA CERVICAL CYTOLOGY 5/23/2020 *Topic was postponed. The date shown is not the original due date. Allergies as of 5/29/2018  Review Complete On: 5/29/2018 By: Marco Beebe MD  
  
 Severity Noted Reaction Type Reactions Pertussis Vaccines High 05/10/2010    Other (comments) Shortness of breathe Mouth Cleanser [Carbamide Peroxide]  05/10/2010    Hives  
 glyoxide Pineapple  05/10/2010    Hives Rondec [Brompheniramine-pseudoephedrin]  05/10/2010    Hives Shellfish Containing Products  05/10/2010    Hives Tetracycline  05/10/2010    Hives Current Immunizations  Reviewed on 1/17/2013 Name Date Influenza Vaccine Whole 11/6/2012 Not reviewed this visit You Were Diagnosed With   
  
 Codes Comments Depression with anxiety    -  Primary ICD-10-CM: F41.8 ICD-9-CM: 300.4 Encounter for routine gynecological examination with Papanicolaou smear of cervix     ICD-10-CM: C31.572 ICD-9-CM: V72.31, V76.2 H/O gastric bypass     ICD-10-CM: Z98.84 ICD-9-CM: V45.86 Obesity (BMI 30-39. 9)     ICD-10-CM: E66.9 ICD-9-CM: 278.00 Vitals BP Pulse Temp Resp Height(growth percentile) Weight(growth percentile) 100/68 90 99.2 °F (37.3 °C) (Oral) 18 5' 7\" (1.702 m) 209 lb 3.2 oz (94.9 kg) SpO2 BMI OB Status Smoking Status 98% 32.77 kg/m2 Chemically Induced Never Smoker Vitals History BMI and BSA Data Body Mass Index Body Surface Area 32.77 kg/m 2 2.12 m 2 Preferred Pharmacy Pharmacy Name Phone [facility] #[id number] [facility], [address] [place] [address] [phone] Your Updated Medication List  
  
   
This list is accurate as of 5/29/18 12:50 PM.  Always use your most recent med list.  
  
  
  
  
 ALPRAZolam 0.25 mg tablet Commonly known as:  [place] Take 1 Tab by mouth two (2) times daily as needed for Anxiety. levonorgestrel-ethinyl estradiol 0.15-0.03 mg Tab Commonly known as:  ALTAVERA (28) Take 1 Tab by mouth daily. omeprazole 40 mg capsule Commonly known as:  PRILOSEC TK 1 C PO D  
  
 OTHER Cardizem unsure of dosage PEPCID PO Take  by mouth. sertraline 100 mg tablet Commonly known as:  ZOLOFT Take 1 tab daily SUMAtriptan 50 mg tablet Commonly known as:  IMITREX TK 1 T PO BID PRN Prescriptions Printed Refills ALPRAZolam (XANAX) 0.25 mg tablet 2 Sig: Take 1 Tab by mouth two (2) times daily as needed for Anxiety. Class: Print Route: Oral  
  
Prescriptions Sent to Pharmacy Refills  
 sertraline (ZOLOFT) 100 mg tablet 3 Sig: Take 1 tab daily Class: Normal  
 Pharmacy: [facility]/pharmacy #[id number] - [place], [place] - [address] [facility] Ph #: [phone]  
 levonorgestrel-ethinyl estradiol (ALTAVERA, 28,) 0.15-0.03 mg tab 4 Sig: Take 1 Tab by mouth daily.   
 Class: Normal  
 Pharmacy: [facility]/pharmacy [address] [facility], [address] [facility] #: [phone] Route: Oral  
  
Follow-up Instructions Return in about 4 months (around 9/29/2018), or if symptoms worsen or fail to improve. Patient Instructions Body Mass Index: Care Instructions Your Care Instructions Body mass index (BMI) can help you see if your weight is raising your risk for health problems. It uses a formula to compare how much you weigh with how tall you are. · A BMI lower than 18.5 is considered underweight. · A BMI between 18.5 and 24.9 is considered healthy. · A BMI between 25 and 29.9 is considered overweight. A BMI of 30 or higher is considered obese. If your BMI is in the normal range, it means that you have a lower risk for weight-related health problems. If your BMI is in the overweight or obese range, you may be at increased risk for weight-related health problems, such as high blood pressure, heart disease, stroke, arthritis or joint pain, and diabetes. If your BMI is in the underweight range, you may be at increased risk for health problems such as fatigue, lower protection (immunity) against illness, muscle loss, bone loss, hair loss, and hormone problems. BMI is just one measure of your risk for weight-related health problems. You may be at higher risk for health problems if you are not active, you eat an unhealthy diet, or you drink too much alcohol or use tobacco products. Follow-up care is a key part of your treatment and safety. Be sure to make and go to all appointments, and call your doctor if you are having problems. It's also a good idea to know your test results and keep a list of the medicines you take. How can you care for yourself at home? · Practice healthy eating habits. This includes eating plenty of fruits, vegetables, whole grains, lean protein, and low-fat dairy. · If your doctor recommends it, get more exercise. Walking is a good choice. Bit by bit, increase the amount you walk every day. Try for at least 30 minutes on most days of the week. · Do not smoke. Smoking can increase your risk for health problems. If you need help quitting, talk to your doctor about stop-smoking programs and medicines. These can increase your chances of quitting for good. · Limit alcohol to 2 drinks a day for men and 1 drink a day for women. Too much alcohol can cause health problems. If you have a BMI higher than 25 · Your doctor may do other tests to check your risk for weight-related health problems. This may include measuring the distance around your waist. A waist measurement of more than 40 inches in men or 35 inches in women can increase the risk of weight-related health problems. · Talk with your doctor about steps you can take to stay healthy or improve your health. You may need to make lifestyle changes to lose weight and stay healthy, such as changing your diet and getting regular exercise. If you have a BMI lower than 18.5 · Your doctor may do other tests to check your risk for health problems. · Talk with your doctor about steps you can take to stay healthy or improve your health. You may need to make lifestyle changes to gain or maintain weight and stay healthy, such as getting more healthy foods in your diet and doing exercises to build muscle. Where can you learn more? Go to http://yung-johan.info/. Enter S176 in the search box to learn more about \"Body Mass Index: Care Instructions. \" Current as of: October 13, 2016 Content Version: 11.4 © 1336-7051 [employer]. Care instructions adapted under license by [employer] (which disclaims liability or warranty for this information).  If you have questions about a medical condition or this instruction, always ask your healthcare professional. [employer] disclaims any warranty or liability for your use of this information. Introducing \[facility]\"" & HEALTH SERVICES! [facility] introduces [employer] patient portal. Now you can access parts of your medical record, email your doctor's office, and request medication refills online. 1. In your internet browser, go to [email or web address] 2. Click on the First Time User? Click Here link in the Sign In box. You will see the New Member Sign Up page. 3. Enter your [employer] Access Code exactly as it appears below. You will not need to use this code after youve completed the sign-up process. If you do not sign up before the expiration date, you must request a new code. · [employer] Access Code: [email or web address] Expires: 8/27/2018 12:18 PM 
 
4. Enter the last four digits of your Social Security Number (xxxx) and Date of Birth (mm/dd/yyyy) as indicated and click Submit. You will be taken to the next sign-up page. 5. Create a [employer] ID. This will be your [employer] login ID and cannot be changed, so think of one that is secure and easy to remember. 6. Create a [employer] password. You can change your password at any time. 7. Enter your Password Reset Question and Answer. This can be used at a later time if you forget your password. 8. Enter your e-mail address. You will receive e-mail notification when new information is available in [address] 9. Click Sign Up. You can now view and download portions of your medical record. 10. Click the Download Summary menu link to download a portable copy of your medical information. If you have questions, please visit the Frequently Asked Questions section of the [employer] website. Remember, [employer] is NOT to be used for urgent needs. For medical emergencies, dial 911. Now available from your iPhone and Android! Please provide this summary of care documentation to your next provider. Your primary care clinician is listed as Micky Tobar. If you have any questions after today's visit, please call [phone].

## 2018-06-01 PROBLEM — K29.70 GASTRITIS: Status: ACTIVE | Noted: 2018-05-16

## 2018-07-18 ENCOUNTER — OFFICE VISIT (OUTPATIENT)
Dept: SURGERY | Age: 43
End: 2018-07-18

## 2018-07-18 VITALS
HEART RATE: 68 BPM | BODY MASS INDEX: 30.48 KG/M2 | OXYGEN SATURATION: 99 % | DIASTOLIC BLOOD PRESSURE: 60 MMHG | HEIGHT: 67 IN | WEIGHT: 194.2 LBS | SYSTOLIC BLOOD PRESSURE: 110 MMHG | TEMPERATURE: 98.3 F

## 2018-07-18 DIAGNOSIS — R10.2 CHRONIC PELVIC PAIN IN FEMALE: ICD-10-CM

## 2018-07-18 DIAGNOSIS — G89.29 CHRONIC PELVIC PAIN IN FEMALE: ICD-10-CM

## 2018-07-18 DIAGNOSIS — Z98.891 S/P CESAREAN SECTION: ICD-10-CM

## 2018-07-18 DIAGNOSIS — Z01.419 ENCOUNTER FOR ROUTINE GYNECOLOGICAL EXAMINATION WITH PAPANICOLAOU SMEAR OF CERVIX: ICD-10-CM

## 2018-07-18 DIAGNOSIS — Z80.41 FAMILY HISTORY OF OVARIAN CANCER: ICD-10-CM

## 2018-07-18 DIAGNOSIS — Z98.890 HISTORY OF MYOMECTOMY: ICD-10-CM

## 2018-07-18 DIAGNOSIS — Z01.419 WOMEN'S ANNUAL ROUTINE GYNECOLOGICAL EXAMINATION: Primary | ICD-10-CM

## 2018-07-18 DIAGNOSIS — N80.30 ENDOMETRIOSIS OF PELVIC PERITONEUM: ICD-10-CM

## 2018-07-18 DIAGNOSIS — N94.6 DYSMENORRHEA: ICD-10-CM

## 2018-07-18 RX ORDER — LEVONORGESTREL AND ETHINYL ESTRADIOL 0.15-0.03
KIT ORAL
Qty: 84 TAB | Refills: 4 | Status: SHIPPED | OUTPATIENT
Start: 2018-07-18 | End: 2019-08-12 | Stop reason: SDUPTHER

## 2018-07-18 NOTE — MR AVS SNAPSHOT
[address] [place] [address]. [facility] [address] [address] [phone] Patient: Mortimer Dew MRN: [id number] Visit Information Date & Time Provider Department Dept. Phone Encounter #  
 2018  9:15 AM Quinn Jang, [address] [place] [address] [address] [id number] Follow-up Instructions Return in about 1 year (around 2019), or if symptoms worsen or fail to improve. Your Appointments 2018 12:00 PM  
Any with Moustapha Davis MD  
[address] [place] [address] and Primary Care [address] [place] [address]) Appt Note: 3 MONTH FOLLOW UP  
 [address] 1 [facility]  
  
   
 [address] [address] Upcoming Health Maintenance Date Due DTaP/Tdap/Td series (1 - Tdap) 2019* Influenza Age 5 to Adult 2018 PAP AKA CERVICAL CYTOLOGY 2020 *Topic was postponed. The date shown is not the original due date. Allergies as of 2018  Review Complete On: 2018 By: Quinn Jang MD  
  
 Severity Noted Reaction Type Reactions Pertussis Vaccines High 05/10/2010    Other (comments) Shortness of breathe Mouth Cleanser [Carbamide Peroxide]  05/10/2010    Hives  
 glyoxide Pineapple  05/10/2010    Hives Rondec [Brompheniramine-pseudoephedrin]  05/10/2010    Hives Shellfish Containing Products  05/10/2010    Hives Tetracycline  05/10/2010    Hives Current Immunizations  Reviewed on 2013 Name Date Influenza Vaccine Whole 2012 Not reviewed this visit You Were Diagnosed With   
  
 Codes Comments Women's annual routine gynecological examination    -  Primary ICD-10-CM: M24.844 ICD-9-CM: V72.31 Dysmenorrhea     ICD-10-CM: N94.6 ICD-9-CM: 625.3 Endometriosis of pelvic peritoneum     ICD-10-CM: N80.3 ICD-9-CM: 617.3  Chronic pelvic pain in female     ICD-10-CM: R10.2, G89.29 
 ICD-9-CM: 625.9, 338.29 Family history of ovarian cancer     ICD-10-CM: Z80.41 ICD-9-CM: V16.41 History of myomectomy     ICD-10-CM: Z98.890 ICD-9-CM: V45.89 S/P  section     ICD-10-CM: V34.904 ICD-9-CM: V45.89 Vitals BP Pulse Temp Height(growth percentile) Weight(growth percentile) SpO2  
 110/60 68 98.3 °F (36.8 °C) (Temporal) 5' 7\" (1.702 m) 194 lb 3.2 oz (88.1 kg) 99% BMI OB Status Smoking Status 30.42 kg/m2 Chemically Induced Never Smoker Vitals History BMI and BSA Data Body Mass Index Body Surface Area  
 30.42 kg/m 2 2.04 m 2 Preferred Pharmacy Pharmacy Name Phone [facility]/PHARMACY #[id number] Dali Carlson, [address] [place] [address] [phone] Your Updated Medication List  
  
   
This list is accurate as of 18  9:55 AM.  Always use your most recent med list.  
  
  
  
  
 ALPRAZolam 0.25 mg tablet Commonly known as:  Shey Cayla Take 1 Tab by mouth two (2) times daily as needed for Anxiety. levonorgestrel-ethinyl estradiol 0.15-0.03 mg Tab Commonly known as:  ALTAVERA (28) Take 1 Tab by mouth daily. OTHER Cardizem unsure of dosage PEPCID PO Take  by mouth. sertraline 100 mg tablet Commonly known as:  ZOLOFT Take 1 tab daily SUMAtriptan 50 mg tablet Commonly known as:  IMITREX TK 1 T PO BID PRN Follow-up Instructions Return in about 1 year (around 2019), or if symptoms worsen or fail to improve. Introducing \[facility]\"" & HEALTH SERVICES! [employer] introduces [employer] patient portal. Now you can access parts of your medical record, email your doctor's office, and request medication refills online. 1. In your internet browser, go to [email or web address] 2. Click on the First Time User? Click Here link in the Sign In box. You will see the New Member Sign Up page. 3. Enter your Loopport Access Code exactly as it appears below. You will not need to use this code after youve completed the sign-up process. If you do not sign up before the expiration date, you must request a new code. · Loopport Access Code: [email or web address] Expires: 8/27/2018 12:18 PM 
 
4. Enter the last four digits of your Social Security Number (xxxx) and Date of Birth (mm/dd/yyyy) as indicated and click Submit. You will be taken to the next sign-up page. 5. Create a Cloudwiset ID. This will be your Loopport login ID and cannot be changed, so think of one that is secure and easy to remember. 6. Create a Loopport password. You can change your password at any time. 7. Enter your Password Reset Question and Answer. This can be used at a later time if you forget your password. 8. Enter your e-mail address. You will receive e-mail notification when new information is available in [address] 9. Click Sign Up. You can now view and download portions of your medical record. 10. Click the Download Summary menu link to download a portable copy of your medical information. If you have questions, please visit the Frequently Asked Questions section of the Loo[employer] website. Remember, Loo[employer]ort is NOT to be used for urgent needs. For medical emergencies, dial 911. Now available from your iPhone and Android! Please provide this summary of care documentation to your next provider. Your primary care clinician is listed as Servando Forman. If you have any questions after today's visit, please call [phone].

## 2018-07-18 NOTE — PROGRESS NOTES
SUBJECTIVE: Padmini Goodman is a 37 y.o. female who presents with desire for annual well woman exam. No LMP recorded. Patient is not currently having periods (Reason: Chemically Induced). Had  2013 and recurrence of fibroids noted, there were 7 or 8 seen at that time. Menses heavy with large clots. Long hx of endometriosis. Past Medical History:   Diagnosis Date    Abnormal Pap smear     -recheck and was normal    Asthma     Asthma     Proventil inhaler PRN    Depression with anxiety 2018    Depression with anxiety     Endometriosis     fredy valerio md    Family history of ovarian cancer 2014    Fibroids     Gastritis 2018    EGD -  angel Ewing md    GERD (gastroesophageal reflux disease)     reflux    H/O gastric bypass 2018    gastric bypass [facility]wing [facility] - [facility]    Heart abnormalities     heart mumor     Hx of gastric bypass 10/21/2015    laparoscopic sleeve gastrectomy - inital weight 266 - Angel Ewing md    HX OTHER MEDICAL     migraines    Migraines 2011    Obesity (BMI 30-39. 9)     Scoliosis of thoracic spine 2010    mri    Thyroid nodule     migraines     Past Surgical History:   Procedure Laterality Date    HX CHOLECYSTECTOMY  2013    [facility]vin [facility] HX GASTRIC BYPASS      HX GI      HX MYOMECTOMY  5/17/10    HX OTHER SURGICAL      pilonidal cyst    HX OTHER SURGICAL      tonsillectomy    HX OTHER SURGICAL      cyst removed lip    HX OTHER SURGICAL      fibroids removed    MYOMECTOMY 1-4,W/TOT 250GMS/<,ABD [facility]  5/17/10     OB History      Para Term  AB Living    1 1 1 0 0 1    SAB TAB Ectopic Molar Multiple Live Births    0 0 0  0 1        Family History   Problem Relation Age of Onset    Heart Disease Mother     Cancer Mother      ovarian cancer    Breast Cancer Mother      ? age     Social History     Social History    Marital status:  Spouse name: N/A    Number of children: N/A    Years of education: N/A     Occupational History    Not on file. Social History Main Topics    Smoking status: Never Smoker    Smokeless tobacco: Never Used    Alcohol use Yes      Comment: occasional    Drug use: No    Sexual activity: Not Currently     Partners: Male     Birth control/ protection: None     Other Topics Concern    Not on file     Social History Narrative    Habits:  She is a never smoker. Never drinker, and does not abuse drugs. Social History:  The patient is  to her second  of 5 years duration and now    She has one child who is [de-identified] years old, a son. 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 She was going to [employer] but she is considering going to [employer]. Family History:  Father is alive and well. Mother  at age 64 of ventricular atrial fibrillation. Other medical problems included end stage renal disease on dialysis, diabetes, heart disease, obesity. She has two sisters, alive and well. Current Outpatient Prescriptions   Medication Sig Dispense Refill    sertraline (ZOLOFT) 100 mg tablet Take 1 tab daily 90 Tab 3    levonorgestrel-ethinyl estradiol (ALTAVERA, 28,) 0.15-0.03 mg tab Take 1 Tab by mouth daily. 3 Package 4    ALPRAZolam (XANAX) 0.25 mg tablet Take 1 Tab by mouth two (2) times daily as needed for Anxiety. 60 Tab 2    SUMAtriptan (IMITREX) 50 mg tablet TK 1 T PO BID PRN  3    FAMOTIDINE (PEPCID PO) Take  by mouth.  OTHER Cardizem unsure of dosage         Review of Systems:   Constitutional: No weight change, chills or fever, anorexia, weakness or sleep disturbance . Cardiovascular: No chest pain, shortness of breath, or palpitations . Respiratory: No cough, shortness of breath, hemoptysis, or orthopnea . Neurologic: No syncope, headaches or seizures .  Hematologic: No easy bruising or unusual bleeding . Psychiatric: No insomnia, confusion, depression, or anxiety . GI:No nausea and vomiting, diarrhea or constipation  . : See HPI . Musculoskeletal: No joint pain or muscle pain . Endocrine: No polydipsia, polyuria, cold intolerance, excessive fatigue, or sleep disturbance . Integumentary: No breast pain, lumps, nipple discharge, or axillary lumps . Objective:     Visit Vitals    /60    Pulse 68    Temp 98.3 °F (36.8 °C) (Temporal)    Ht 5' 7\" (1.702 m)    Wt 194 lb 3.2 oz (88.1 kg)    SpO2 99%    BMI 30.42 kg/m2       General:  alert, cooperative, no distress, appears stated age   Skin:  no rash or abnormalities   Eyes: negative   Mouth: MMM no lesions   Lymph Nodes:  Cervical, supraclavicular, and axillary nodes normal.   Breast Exam: normal appearance, no masses or tenderness    Lungs:  clear to auscultation bilaterally   Heart:  regular rate and rhythm, S1, S2 normal, no murmur, click, rub or gallop   Abdomen: abnormal findings:  tenderness moderate in the RLQ   Back:  Costovertebral angle tenderness absent   Genitourinary: Pelvic exam: VULVA: normal appearing vulva with no masses, tenderness or lesions, VAGINA: normal appearing vagina with normal color and discharge, no lesions, CERVIX: normal appearing cervix without discharge or lesions, UTERUS: anteverted, enlarged to ? week's size, irregular, ADNEXA: tenderness right    Extremities:  extremities normal, atraumatic, no cyanosis or edema   Neurologic:  negative   Psychiatric:  non focal     ASSESSMENT:      ICD-10-CM ICD-9-CM    1. Women's annual routine gynecological examination Z01.419 V72.31    2. Dysmenorrhea N94.6 625.3    3. Endometriosis of pelvic peritoneum N80.3 617.3    4. Chronic pelvic pain in female R10.2 625.9     G89.29 338.29    5. Family history of ovarian cancer Z80.41 V16.41    6. History of myomectomy Z98.890 V45.89    7.  S/P  section B77.098 V45.89         Follow-up Disposition:  Return in about 1 year (around 7/18/2019), or if symptoms worsen or fail to improve.

## 2018-09-07 ENCOUNTER — OFFICE VISIT (OUTPATIENT)
Dept: INTERNAL MEDICINE CLINIC | Age: 43
End: 2018-09-07

## 2018-09-07 VITALS
HEART RATE: 77 BPM | HEIGHT: 67 IN | RESPIRATION RATE: 18 BRPM | SYSTOLIC BLOOD PRESSURE: 114 MMHG | BODY MASS INDEX: 29.6 KG/M2 | OXYGEN SATURATION: 97 % | DIASTOLIC BLOOD PRESSURE: 78 MMHG | WEIGHT: 188.6 LBS | TEMPERATURE: 98.8 F

## 2018-09-07 DIAGNOSIS — Z98.84 HX OF GASTRIC BYPASS: ICD-10-CM

## 2018-09-07 DIAGNOSIS — E66.9 OBESITY (BMI 30-39.9): ICD-10-CM

## 2018-09-07 DIAGNOSIS — G43.009 MIGRAINE WITHOUT AURA AND WITHOUT STATUS MIGRAINOSUS, NOT INTRACTABLE: Primary | ICD-10-CM

## 2018-09-07 DIAGNOSIS — F41.8 DEPRESSION WITH ANXIETY: ICD-10-CM

## 2018-09-07 RX ORDER — SUMATRIPTAN 50 MG/1
TABLET, FILM COATED ORAL
Qty: 12 TAB | Refills: 11 | Status: SHIPPED | OUTPATIENT
Start: 2018-09-07 | End: 2021-07-28

## 2018-09-07 NOTE — MR AVS SNAPSHOT
[address] [place] [address] [facility] [address] 
[phone] Patient: Andriy Booker MRN: [id number] UBV:[birth year] Visit Information Date & Time Provider Department Dept. Phone Encounter #  
 9/7/2018 12:00 PM [facility] [phone] [id number] Follow-up Instructions Return in about 1 year (around 9/7/2019). Follow-up and Disposition History Upcoming Health Maintenance Date Due DTaP/Tdap/Td series (1 - Tdap) 5/29/2019* Influenza Age 5 to Adult 9/7/2019* PAP AKA CERVICAL CYTOLOGY 5/23/2020 *Topic was postponed. The date shown is not the original due date. Allergies as of 9/7/2018  Review Complete On: 9/7/2018 By: Valente Dubois LPN Severity Noted Reaction Type Reactions Pertussis Vaccines High 05/10/2010    Other (comments) Shortness of breathe Mouth Cleanser [Carbamide Peroxide]  05/10/2010    Hives  
 glyoxide Pineapple  05/10/2010    Hives Rondec [Brompheniramine-pseudoephedrin]  05/10/2010    Hives Shellfish Containing Products  05/10/2010    Hives Tetracycline  05/10/2010    Hives Current Immunizations  Reviewed on 1/17/2013 Name Date Influenza Vaccine Whole 11/6/2012 Not reviewed this visit You Were Diagnosed With   
  
 Codes Comments Migraine without aura and without status migrainosus, not intractable    -  Primary ICD-10-CM: G43.009 ICD-9-CM: 346.10 Obesity (BMI 30-39. 9)     ICD-10-CM: E66.9 ICD-9-CM: 278.00 Hx of gastric bypass     ICD-10-CM: Z98.84 ICD-9-CM: V45.86 Depression with anxiety     ICD-10-CM: F41.8 ICD-9-CM: 300.4 Vitals BP Pulse Temp Resp Height(growth percentile) Weight(growth percentile) 114/78 77 98.8 °F (37.1 °C) (Oral) 18 5' 7\" (1.702 m) 188 lb 9.6 oz (85.5 kg) SpO2 BMI OB Status Smoking Status 97% 29.54 kg/m2 Chemically Induced Never Smoker Vitals History BMI and BSA Data Body Mass Index Body Surface Area  
 29.54 kg/m 2 2.01 m 2 Preferred Pharmacy Pharmacy Name Phone [facility]/PHARMACY #[id number] Ramon Capps, [address] [place] [address] [phone] Your Updated Medication List  
  
   
This list is accurate as of 9/7/18  2:03 PM.  Always use your most recent med list.  
  
  
  
  
 ALPRAZolam 0.25 mg tablet Commonly known as:  Swisher Curio Take 1 Tab by mouth two (2) times daily as needed for Anxiety. LILLOW 0.15-0.03 mg Tab Generic drug:  levonorgestrel-ethinyl estradiol TAKE 1 TABLET BY MOUTH EVERY DAY  
  
 OTHER Cardizem unsure of dosage PEPCID PO Take  by mouth. sertraline 100 mg tablet Commonly known as:  ZOLOFT Take 1 tab daily SUMAtriptan 50 mg tablet Commonly known as:  IMITREX TK 1 T PO BID PRN Prescriptions Sent to Pharmacy Refills SUMAtriptan (IMITREX) 50 mg tablet 11 Sig: TK 1 T PO BID PRN Class: Normal  
 Pharmacy: [facility]/pharmacy [address], [address] [place] [address] Ph #: [phone] Follow-up Instructions Return in about 1 year (around 9/7/2019). Introducing [facility] & HEALTH SERVICES! [facility] introduces [employer] patient portal. Now you can access parts of your medical record, email your doctor's office, and request medication refills online. 1. In your internet browser, go to [email or web address] 2. Click on the First Time User? Click Here link in the Sign In box. You will see the New Member Sign Up page. 3. Enter your VenJuvo Access Code exactly as it appears below. You will not need to use this code after youve completed the sign-up process. If you do not sign up before the expiration date, you must request a new code. · VenJuvo Access Code: [email or web address] Expires: 12/6/2018  2:03 PM 
 
 4. Enter the last four digits of your Social Security Number (xxxx) and Date of Birth (mm/dd/yyyy) as indicated and click Submit. You will be taken to the next sign-up page. 5. Create a [employer] ID. This will be your [employer] login ID and cannot be changed, so think of one that is secure and easy to remember. 6. Create a [employer] password. You can change your password at any time. 7. Enter your Password Reset Question and Answer. This can be used at a later time if you forget your password. 8. Enter your e-mail address. You will receive e-mail notification when new information is available in [address] 9. Click Sign Up. You can now view and download portions of your medical record. 10. Click the Download Summary menu link to download a portable copy of your medical information. If you have questions, please visit the Frequently Asked Questions section of the [employer] website. Remember, [employer] is NOT to be used for urgent needs. For medical emergencies, dial 911. Now available from your iPhone and Android! Please provide this summary of care documentation to your next provider. Your primary care clinician is listed as Ninfa Munson. If you have any questions after today's visit, please call [phone].

## 2018-09-07 NOTE — PROGRESS NOTES
1. Have you been to the ER, urgent care clinic since your last visit? Hospitalized since your last visit? No    2. Have you seen or consulted any other health care providers outside of the [address] [place] [address] since your last visit? Include any pap smears or colon screening.  No     Medication refill imitrex

## 2018-09-07 NOTE — PROGRESS NOTES
SPORTS MEDICINE AND PRIMARY CARE  Kathryn Washington MD, [address] [address]  Phone:  [phone]  Fax: [phone]       Chief Complaint   Patient presents with    Migraine     f/u   . SUBJECTIVE:    Hugh Amador is a 37 y.o. female Patient returns today with known history of migraine headaches, bronchial asthma, depression with anxiety, fibroid uterus, GERD, S/P gastric bypass 05/16/18, and comes in for follow up. Since the bypass she's lost 60+ lbs. She was unable to get her Imitrex refilled, which is the primary reason for the visit today. Other specific complaints denied. Her last headache was Monday. Current Outpatient Prescriptions   Medication Sig Dispense Refill    SUMAtriptan (IMITREX) 50 mg tablet TK 1 T PO BID PRN 12 Tab 11    LILLOW 0.15-0.03 mg tab TAKE 1 TABLET BY MOUTH EVERY DAY 84 Tab 4    sertraline (ZOLOFT) 100 mg tablet Take 1 tab daily 90 Tab 3    ALPRAZolam (XANAX) 0.25 mg tablet Take 1 Tab by mouth two (2) times daily as needed for Anxiety. 60 Tab 2    FAMOTIDINE (PEPCID PO) Take  by mouth.  OTHER Cardizem unsure of dosage       Past Medical History:   Diagnosis Date    Abnormal Pap smear     2006-recheck and was normal    Asthma     Asthma     Proventil inhaler PRN    Depression with anxiety 05/29/2018    Depression with anxiety     Endometriosis     fredy valerio md    Family history of ovarian cancer 5/1/2014    Fibroids     Gastritis 05/16/2018    EGD -  angel Ewing md    GERD (gastroesophageal reflux disease)     reflux    H/O gastric bypass 05/16/2018    gastric bypass [facility]wing [facility] - [facility]    Heart abnormalities     heart mumor     Hx of gastric bypass 10/21/2015    laparoscopic sleeve gastrectomy - inital weight 266 - Angel Ewing md    HX OTHER MEDICAL     migraines    Migraines 6/29/2011    Obesity (BMI 30-39. 9)     Scoliosis of thoracic spine 03/01/2010    mri    Thyroid nodule migraines     Past Surgical History:   Procedure Laterality Date    HX CHOLECYSTECTOMY  04/20/2013    [facility]vin at [facility] HX GASTRIC BYPASS  %/16/2018    HX GI      HX MYOMECTOMY  5/17/10    HX OTHER SURGICAL      pilonidal cyst    HX OTHER SURGICAL      tonsillectomy    HX OTHER SURGICAL      cyst removed lip    HX OTHER SURGICAL      fibroids removed    MYOMECTOMY 1-4,W/TOT 250GMS/<,ABD [facility]  5/17/10     Allergies   Allergen Reactions    Pertussis Vaccines Other (comments)     Shortness of breathe    Mouth Cleanser [Carbamide Peroxide] Hives     glyoxide    Pineapple Hives    Rondec [Brompheniramine-Pseudoephedrin] Hives    Shellfish Containing Products Hives    Tetracycline Hives         REVIEW OF SYSTEMS:  General: negative for - chills or fever  ENT: negative for - headaches, nasal congestion or tinnitus  Respiratory: negative for - cough, hemoptysis, shortness of breath or wheezing  Cardiovascular : negative for - chest pain, edema, palpitations or shortness of breath  Gastrointestinal: negative for - abdominal pain, blood in stools, heartburn or nausea/vomiting  Genito-Urinary: no dysuria, trouble voiding, or hematuria  Musculoskeletal: negative for - gait disturbance, joint pain, joint stiffness or joint swelling  Neurological: no TIA or stroke symptoms  Hematologic: no bruises, no bleeding, no swollen glands  Integument: no lumps, mole changes, nail changes or rash  Endocrine: no malaise/lethargy or unexpected weight changes      Social History     Social History    Marital status:      Spouse name: N/A    Number of children: N/A    Years of education: N/A     Social History Main Topics    Smoking status: Never Smoker    Smokeless tobacco: Never Used    Alcohol use Yes      Comment: occasional    Drug use: No    Sexual activity: Yes     Partners: Male     Birth control/ protection: None, Pill     Other Topics Concern    None     Social History Narrative Habits:  She is a never smoker. Never drinker, and does not abuse drugs. Social History:  The patient is  to her second  of 5 years duration and now    She has one child who is [de-identified] years old, a son. 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 She was going to be2 but she is considering going to [employer]. Family History:  Father is alive and well. Mother  at age 64 of ventricular atrial fibrillation. Other medical problems included end stage renal disease on dialysis, diabetes, heart disease, obesity. She has two sisters, alive and well. Family History   Problem Relation Age of Onset    Heart Disease Mother     Cancer Mother      ovarian cancer    Breast Cancer Mother      ? age       OBJECTIVE:    Visit Vitals    /78    Pulse 77    Temp 98.8 °F (37.1 °C) (Oral)    Resp 18    Ht 5' 7\" (1.702 m)    Wt 188 lb 9.6 oz (85.5 kg)    SpO2 97%    BMI 29.54 kg/m2     CONSTITUTIONAL: well , well nourished, appears age appropriate  EYES: perrla, eom intact  ENMT:moist mucous membranes, pharynx clear  NECK: supple. Thyroid normal  RESPIRATORY: Chest: clear bilaterally   CARDIOVASCULAR: Heart: regular rate and rhythm  GASTROINTESTINAL: Abdomen: soft, bowel sounds active  HEMATOLOGIC: no pathological lymph nodes palpated  MUSCULOSKELETAL: Extremities: no edema, pulse 1+   INTEGUMENT: No unusual rashes or suspicious skin lesions noted. Nails appear normal.  NEUROLOGIC: non-focal exam   MENTAL STATUS: alert and oriented, appropriate affect           ASSESSMENT:  1. Migraine without aura and without status migrainosus, not intractable    2. Obesity (BMI 30-39.9)    3. Hx of gastric bypass    4. Depression with anxiety      For the migraine headaches Imitrex has been successful and we renew the medication for a year.     We note that she develops intertrigo for which she uses Lotrisone cream and Gold Bond Powder. We suggest surgical correction to decrease her risk of developing abdominal wall cellulitis. She agrees. BP is well into goal as would be expected. We're excited for her weight loss. 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 She'll return to see us in six months or as needed. We'll see her sooner if she has any problems. I have discussed the diagnosis with the patient and the intended plan as seen in the  orders above. The patient understands and agees with the plan. The patient has   received an after visit summary and questions were answered concerning  future plans  Patient labs and/or xrays were reviewed  Past records were reviewed. PLAN:  .  Orders Placed This Encounter    SUMAtriptan (IMITREX) 50 mg tablet       Follow-up Disposition:  Return in about 1 year (around 9/7/2019). ATTENTION:   This medical record was transcribed using an electronic medical records system. Although proofread, it may and can contain electronic and spelling errors. Other human spelling and other errors may be present. Corrections may be executed at a later time. Please feel free to contact us for any clarifications as needed.

## 2019-02-28 ENCOUNTER — HOSPITAL ENCOUNTER (OUTPATIENT)
Dept: MAMMOGRAPHY | Age: 44
Discharge: HOME | End: 2019-02-28
Attending: INTERNAL MEDICINE
Payer: COMMERCIAL

## 2019-02-28 DIAGNOSIS — Z12.39 SCREENING BREAST EXAMINATION: ICD-10-CM

## 2019-02-28 PROCEDURE — 77067 SCR MAMMO BI INCL CAD: CPT

## 2019-08-12 ENCOUNTER — TELEPHONE (OUTPATIENT)
Dept: SURGERY | Age: 44
End: 2019-08-12

## 2019-08-12 DIAGNOSIS — Z01.419 ENCOUNTER FOR ROUTINE GYNECOLOGICAL EXAMINATION WITH PAPANICOLAOU SMEAR OF CERVIX: ICD-10-CM

## 2019-08-12 RX ORDER — LEVONORGESTREL AND ETHINYL ESTRADIOL 0.15-0.03
KIT ORAL
Qty: 84 TAB | Refills: 4 | Status: SHIPPED | OUTPATIENT
Start: 2019-08-12 | End: 2020-06-22

## 2019-08-12 NOTE — TELEPHONE ENCOUNTER
Called spoke with pt advised her that the birth control she requested was ssent to her pharmacy on file pt was appreciative and verbalized understanding.

## 2019-08-13 ENCOUNTER — OFFICE VISIT (OUTPATIENT)
Dept: SURGERY | Age: 44
End: 2019-08-13

## 2019-08-13 VITALS — BODY MASS INDEX: 31.39 KG/M2 | WEIGHT: 200 LBS | HEIGHT: 67 IN

## 2019-08-13 DIAGNOSIS — N80.9 ENDOMETRIOSIS: ICD-10-CM

## 2019-08-13 DIAGNOSIS — Z01.419 ENCOUNTER FOR GYNECOLOGICAL EXAMINATION WITHOUT ABNORMAL FINDING: Primary | ICD-10-CM

## 2019-08-13 NOTE — PROGRESS NOTES
Chief Complaint   Patient presents with    Well Woman     Pt presents in office for annual exam pt would like to discuss birth control. 1. Have you been to the ER, urgent care clinic since your last visit? Hospitalized since your last visit? No    2. Have you seen or consulted any other health care providers outside of the [address] [place] [address] since your last visit? Include any pap smears or colon screening.  No

## 2019-08-13 NOTE — PATIENT INSTRUCTIONS
Well Visit, Ages 25 to 48: Care Instructions  Your Care Instructions    Physical exams can help you stay healthy. Your doctor has checked your overall health and may have suggested ways to take good care of yourself. He or she also may have recommended tests. At home, you can help prevent illness with healthy eating, regular exercise, and other steps. Follow-up care is a key part of your treatment and safety. Be sure to make and go to all appointments, and call your doctor if you are having problems. It's also a good idea to know your test results and keep a list of the medicines you take. How can you care for yourself at home? · Reach and stay at a healthy weight. This will lower your risk for many problems, such as obesity, diabetes, heart disease, and high blood pressure. · Get at least 30 minutes of physical activity on most days of the week. Walking is a good choice. You also may want to do other activities, such as running, swimming, cycling, or playing tennis or team sports. Discuss any changes in your exercise program with your doctor. · Do not smoke or allow others to smoke around you. If you need help quitting, talk to your doctor about stop-smoking programs and medicines. These can increase your chances of quitting for good. · Talk to your doctor about whether you have any risk factors for sexually transmitted infections (STIs). Having one sex partner (who does not have STIs and does not have sex with anyone else) is a good way to avoid these infections. · Use birth control if you do not want to have children at this time. Talk with your doctor about the choices available and what might be best for you. · Protect your skin from too much sun. When you're outdoors from 10 a.m. to 4 p.m., stay in the shade or cover up with clothing and a hat with a wide brim. Wear sunglasses that block UV rays. Even when it's cloudy, put broad-spectrum sunscreen (SPF 30 or higher) on any exposed skin.   · See a dentist one or two times a year for checkups and to have your teeth cleaned. · Wear a seat belt in the car. Follow your doctor's advice about when to have certain tests. These tests can spot problems early. For everyone  · Cholesterol. Have the fat (cholesterol) in your blood tested after age 21. Your doctor will tell you how often to have this done based on your age, family history, or other things that can increase your risk for heart disease. · Blood pressure. Have your blood pressure checked during a routine doctor visit. Your doctor will tell you how often to check your blood pressure based on your age, your blood pressure results, and other factors. · Vision. Talk with your doctor about how often to have a glaucoma test.  · Diabetes. Ask your doctor whether you should have tests for diabetes. · Colon cancer. Your risk for colorectal cancer gets higher as you get older. Some experts say that adults should start regular screening at age 48 and stop at age 76. Others say to start before age 48 or continue after age 76. Talk with your doctor about your risk and when to start and stop screening. For women  · Breast exam and mammogram. Talk to your doctor about when you should have a clinical breast exam and a mammogram. Medical experts differ on whether and how often women under 50 should have these tests. Your doctor can help you decide what is right for you. · Cervical cancer screening test and pelvic exam. Begin with a Pap test at age 24. The test often is part of a pelvic exam. Starting at age 27, you may choose to have a Pap test, an HPV test, or both tests at the same time (called co-testing). Talk with your doctor about how often to have testing. · Tests for sexually transmitted infections (STIs). Ask whether you should have tests for STIs. You may be at risk if you have sex with more than one person, especially if your partners do not wear condoms.   For men  · Tests for sexually transmitted infections (STIs). Ask whether you should have tests for STIs. You may be at risk if you have sex with more than one person, especially if you do not wear a condom. · Testicular cancer exam. Ask your doctor whether you should check your testicles regularly. · Prostate exam. Talk to your doctor about whether you should have a blood test (called a PSA test) for prostate cancer. Experts differ on whether and when men should have this test. Some experts suggest it if you are older than 39 and are -American or have a father or brother who got prostate cancer when he was younger than 72. When should you call for help? Watch closely for changes in your health, and be sure to contact your doctor if you have any problems or symptoms that concern you. Where can you learn more? Go to http://yung-johan.info/. Enter P072 in the search box to learn more about \"Well Visit, Ages 25 to 48: Care Instructions. \"  Current as of: December 13, 2018  Content Version: 12.1  © 7320-4829 [employer]. Care instructions adapted under license by [employer] (which disclaims liability or warranty for this information). If you have questions about a medical condition or this instruction, always ask your healthcare professional. [address] any warranty or liability for your use of this information. Levonorgestrel (Into the uterus)   Levonorgestrel (nci-den-vzt-DIEGO-trel)  Prevents pregnancy and treats heavy menstrual bleeding. This is an intrauterine device (IUD), which is a reversible form of birth control. This IUD slowly releases levonorgestrel, a hormone. Brand Name(s): Bryce Vo, [facility]   There may be other brand names for this medicine. When This Medicine Should Not Be Used: This device is not right for everyone. Do not use it if you had an allergic reaction to levonorgestrel, or if you are pregnant.   How to Use This Medicine:   Device  · A nurse or other trained health professional will give you this medicine. · The IUD is usually inserted by your doctor during your monthly period. You will need to see your doctor 4 to 6 weeks after the IUD is placed and then once a year. · Your IUD has a string or \"tail\" that is made of plastic thread. About one or two inches of this string hangs into your vagina. You cannot see this string, and it will not cause problems when you have sex. Check your IUD after each monthly period. You may not be protected against pregnancy if you cannot feel the string or if you feel plastic. Do the following to check the placement of your IUD:  [facility] your hands with soap and warm water. Dry them with a clean towel. ¨ Bend your knees and squat low to the ground. ¨ Gently put your index finger high inside your vagina. The cervix is at the top of the vagina. Find the IUD string coming from your cervix. Never pull on the string. You should not be able to feel the plastic of the IUD itself. Wash your hands after you are done checking your IUD string. · Your doctor will need to remove your IUD after 3 years for [place], after 4 years for WIEDEN, or after 5 years for [facility] or [place] [id number]. You will also need to have it replaced if it comes out of your uterus. If you are using Mirena® or Liletta® and want to stop, your doctor can remove it at any time. However, you may become pregnant as soon as Mirena® is removed or if you have intercourse the week before WIEDEN is removed. Use another form of birth control or have a new IUD inserted to keep from getting pregnant. Drugs and Foods to Avoid:   Ask your doctor or pharmacist before using any other medicine, including over-the-counter medicines, vitamins, and herbal products. · Some medicines can affect how this device works. Tell your doctor if you are using a blood thinner (including warfarin).   Warnings While Using This Medicine:   · Tell your doctor if you are breastfeeding, or if you had a baby, miscarriage, or  in the past 3 months. Tell your doctor if you have liver disease (including tumor or cancer), heart disease, breast cancer, heart or blood circulation problems, migraine, high blood pressure, or a history of heart valve problems, blood clotting problems, stroke, or heart attack. Tell your doctor if you have problems with your immune system or have had surgery on your female organs (especially fallopian tubes). · Tell your doctor if you have had any problems, infections, or other conditions that affected your reproductive system. There are many problems that could make an IUD a bad choice for you, including if you have fibroids, unexplained bleeding, a uterus that has an unusual shape, a recent infection, a history of pelvic inflammatory disease, an abnormal Pap test, ectopic pregnancy, cancer or suspected cancer, or an existing IUD. · There is a small chance that you could get pregnant when using an IUD, just as there is with any birth control. If you get pregnant, your doctor may remove your IUD to lower the risk of miscarriage or other problems. · This medicine may cause the following problems:  ¨ Increased risk of ectopic pregnancy (pregnancy outside the uterus)  ¨ Increased risk of serious infections, including sepsis  ¨ Increased risk of pelvic inflammatory disease (PID) or endometritis  ¨ Perforation (hole in the wall of your uterus), which can damage other organs  ¨ Increased risk for ovarian cysts  ¨ Increased risk of breast cancer  ¨ Increased risk of high blood pressure, stroke, heart attack, or clotting problems  · You might have some spotting and cramping during the first weeks after the IUD has been inserted. These symptoms should decrease or go away within a few weeks up to 6 months. · You could have less bleeding or even stop having periods by the end of the first year.  Call your doctor if you have a change from your regular bleeding pattern after you have had your IUD for awhile, such as more bleeding or if you miss a period (and you were having periods even with your IUD). · An IUD can slip partly or all the way out of your uterus. If this happens, use condoms or another form of birth control, and call your doctor right away. · This IUD will not protect you from HIV/AIDS or other sexually transmitted diseases. · If you have the [address] or Micheline [address]ajo, tell your healthcare provider before you have an MRI test.  · Your doctor will check your progress and the effects of this medicine at regular visits. Keep all appointments. Possible Side Effects While Using This Medicine:   Call your doctor right away if you notice any of these side effects:  · Allergic reaction: Itching or hives, swelling in your face or hands, swelling or tingling in your mouth or throat, chest tightness, trouble breathing  · Chest pain, problems with speech or walking, numbness or weakness in your arm or leg or on one side of your body  · Heavy bleeding from your vagina  · Pain during sex, or if your partner feels the hard plastic of the IUD during sex  · Severe headache, vision changes  · Stomach or pelvic pain, tenderness, or cramping that is sudden or severe  · Unusual bleeding, bruising, or weakness  · Vaginal discharge that has a bad smell, fever, chills, sores on your genitals  · Yellow skin or eyes  If you notice these less serious side effects, talk with your doctor:   · Acne or other skin changes  · Breast pain  · Change in bleeding pattern after the first few months  · Dizziness or lightheadedness after IUD is placed  · Mild itching around your vagina and genitals  If you notice other side effects that you think are caused by this medicine, tell your doctor. Call your doctor for medical advice about side effects.  You may report side effects to FDA at 5-690-ZAB-9442  © 2017 [facility] Information is for End User's use only and may not be sold, redistributed or otherwise used for commercial purposes. The above information is an  only. It is not intended as medical advice for individual conditions or treatments. Talk to your doctor, nurse or pharmacist before following any medical regimen to see if it is safe and effective for you.

## 2019-08-13 NOTE — PROGRESS NOTES
Annual exam ages 40-58    Ashlee Cantu is a ,  40 y.o. female [address]. Patient's last menstrual period was 2019. .    She presents for her annual checkup. She is having menstrual problems. Last cycle was heavy and with clots and lasted about 3 weeks. Cycles every 3 months. Prior cycle was very light. Has been on birth control pills since age 15. With regard to the Gardasil vaccine, she is older than the age for which it is FDA approved. Menstrual status:    Cycles every 3 months on OCPs. Pelvic pain comes and goes. When she gets the pain, it is bad. Pain comes about once/month. Pain is worse when she gets stressed. She denies dysmenorrhea. She reports no premenstrual symptoms. Hx of endometriosis and fibroids - s/p myomectomy    Contraception:    The current method of family planning is OCP (estrogen/progesterone). Sexual history:    She  reports that she currently engages in sexual activity and has had partner(s) who are Male. She reports using the following methods of birth control/protection: None and Pill. Medical conditions:    Since her last annual GYN exam about one year ago, she has not the following changes in her health history: none. Pap and Mammogram History:    Her most recent Pap smear was 2017- normal.  Distant hx () of abnormal pap. Normal repeat and normal since. The patient with normal mammogram 2019. Breast Cancer History/Substance Abuse: negative    Osteoporosis History:    Family history does not include a first or second degree relative with osteopenia or osteoporosis. She is currently taking calcium and vit D.     Past Medical History:   Diagnosis Date    Abnormal Pap smear     -recheck and was normal    Asthma     Asthma     Proventil inhaler PRN    Depression with anxiety 2018    Depression with anxiety     Endometriosis     fredy valerio md    Family history of ovarian cancer 2014    Fibroids     Gastritis 05/16/2018    EGD -  angel Ball md    GERD (gastroesophageal reflux disease)     reflux    H/O gastric bypass 05/16/2018    gastric bypass angel ball [facility] - [facility]    Heart abnormalities     heart mumor     Hx of gastric bypass 10/21/2015    laparoscopic sleeve gastrectomy - inital weight 266 - Angel Ball md    HX OTHER MEDICAL     migraines    Migraines 6/29/2011    Obesity (BMI 30-39. 9)     Scoliosis of thoracic spine 03/01/2010    mri    Thyroid nodule     migraines     Past Surgical History:   Procedure Laterality Date    HX CHOLECYSTECTOMY  04/20/2013    lap kevin at [facility] HX GASTRIC BYPASS  %/16/2018    HX GI      HX MYOMECTOMY  5/17/10    HX OTHER SURGICAL      pilonidal cyst    HX OTHER SURGICAL      tonsillectomy    HX OTHER SURGICAL      cyst removed lip    HX OTHER SURGICAL      fibroids removed    MYOMECTOMY 1-4,W/TOT 250GMS/<,ABD [facility]  5/17/10       Current Outpatient Medications   Medication Sig Dispense Refill    levonorgestrel-ethinyl estradiol (LILLOW, 28,) 0.15-0.03 mg tab TAKE 1 TABLET BY MOUTH EVERY DAY 84 Tab 4    sertraline (ZOLOFT) 100 mg tablet TAKE 1 TABLET BY MOUTH EVERY DAY 90 Tab 0    SUMAtriptan (IMITREX) 50 mg tablet TK 1 T PO BID PRN 12 Tab 11    ALPRAZolam (XANAX) 0.25 mg tablet Take 1 Tab by mouth two (2) times daily as needed for Anxiety. 60 Tab 2    FAMOTIDINE (PEPCID PO) Take  by mouth.  OTHER Cardizem unsure of dosage       Allergies: Pertussis vaccines; Mouth cleanser [carbamide peroxide]; Pineapple; Rondec [brompheniramine-pseudoephedrin]; Shellfish containing products; and Tetracycline     Tobacco History:  reports that she has never smoked. She has never used smokeless tobacco.  Alcohol Abuse:  reports that she drinks alcohol. Socially  Drug Abuse:  reports that she does not use drugs. Patient feels safe at home.     Family Medical/Cancer History:   Family History   Problem Relation Age of Onset    Heart Disease Mother     Cancer Mother         ovarian cancer    Breast Cancer Mother         ? age        Review of Systems - History obtained from the patient  Constitutional: negative for weight loss, fever, night sweats  HEENT: negative for hearing loss, earache, congestion, snoring, sorethroat  CV: negative for chest pain, palpitations, edema  Resp: negative for cough, shortness of breath, wheezing  GI: negative for change in bowel habits, abdominal pain, black or bloody stools  : negative for frequency, dysuria, hematuria, vaginal discharge  MSK: negative for back pain, joint pain, muscle pain  Breast: negative for breast lumps, nipple discharge, galactorrhea  Skin :negative for itching, rash, hives  Neuro: negative for dizziness, headache, confusion, weakness  Psych: negative for anxiety, depression, change in mood  Heme/lymph: negative for bleeding, bruising, pallor    Physical Exam    Visit Vitals  Ht 5' 7\" (1.702 m)   Wt 200 lb (90.7 kg)   LMP 06/19/2019   BMI 31.32 kg/m²       Constitutional  · Appearance: well-nourished, well developed, alert, in no acute distress    HENT  · Head and Face: appears normal    Neck  · Inspection/Palpation: normal appearance, no masses or tenderness  · Lymph Nodes: no lymphadenopathy present  · Thyroid: gland size normal, nontender, no nodules or masses present on palpation    Chest  · Respiratory Effort: breathing unlabored  · Auscultation: normal breath sounds    Cardiovascular  · Heart:  · Auscultation: regular rate and rhythm without murmur    Breasts  · Inspection of Breasts: breasts symmetrical, no skin changes, no discharge present, nipple appearance normal, no skin retraction present  · Palpation of Breasts and Axillae: no masses present on palpation, no breast tenderness  · Axillary Lymph Nodes: no lymphadenopathy present    Gastrointestinal  · Abdominal Examination: abdomen non-tender to palpation, normal bowel sounds, no masses present  · Liver and spleen: no hepatomegaly present, spleen not palpable  · Hernias: no hernias identified    Genitourinary  · External Genitalia: normal appearance for age, no discharge present, no tenderness present, no inflammatory lesions present, no masses present, no atrophy present  · Vagina: normal vaginal vault without central or paravaginal defects, no discharge present, no inflammatory lesions present, no masses present  · Bladder: non-tender to palpation  · Urethra: appears normal  · Cervix: normal   · Uterus: normal size, shape and consistency  · Adnexa: no adnexal tenderness present, no adnexal masses present  · Perineum: perineum within normal limits, no evidence of trauma, no rashes or skin lesions present  · Anus: anus within normal limits, no hemorrhoids present  · Inguinal Lymph Nodes: no lymphadenopathy present    Skin  · General Inspection: no rash, no lesions identified    Neurologic/Psychiatric  · Mental Status:  · Orientation: grossly oriented to person, place and time  · Mood and Affect: mood normal, affect appropriate    Assessment:  Routine gynecologic examination  Her current medical status is satisfactory with no evidence of significant gynecologic issues. Plan:  - pap smear up to date  - mammogram up to date  - Continue OCPs for menstrual/endometriosis control. Discussed risks and benefits of combined OCPs after the age of 28. Patient desires to change to Mirena. Will set this up.     Counseled re: diet, exercise, healthy lifestyle  Return for yearly wellness visits  Rec annual mammogram

## 2019-10-31 ENCOUNTER — OFFICE VISIT (OUTPATIENT)
Dept: INTERNAL MEDICINE CLINIC | Age: 44
End: 2019-10-31

## 2019-10-31 VITALS
HEIGHT: 67 IN | BODY MASS INDEX: 31.92 KG/M2 | HEART RATE: 70 BPM | SYSTOLIC BLOOD PRESSURE: 120 MMHG | TEMPERATURE: 97.9 F | WEIGHT: 203.4 LBS | RESPIRATION RATE: 18 BRPM | DIASTOLIC BLOOD PRESSURE: 76 MMHG | OXYGEN SATURATION: 98 %

## 2019-10-31 DIAGNOSIS — Z98.84 HX OF GASTRIC BYPASS: ICD-10-CM

## 2019-10-31 DIAGNOSIS — F41.8 DEPRESSION WITH ANXIETY: ICD-10-CM

## 2019-10-31 DIAGNOSIS — E66.9 OBESITY (BMI 30-39.9): Primary | ICD-10-CM

## 2019-10-31 DIAGNOSIS — M41.9 SCOLIOSIS OF THORACIC SPINE, UNSPECIFIED SCOLIOSIS TYPE: ICD-10-CM

## 2019-10-31 DIAGNOSIS — E04.1 THYROID NODULE: ICD-10-CM

## 2019-10-31 RX ORDER — ALPRAZOLAM 0.25 MG/1
0.25 TABLET ORAL
Qty: 60 TAB | Refills: 2 | Status: SHIPPED | OUTPATIENT
Start: 2019-10-31 | End: 2021-06-17 | Stop reason: ALTCHOICE

## 2019-10-31 RX ORDER — SERTRALINE HYDROCHLORIDE 100 MG/1
TABLET, FILM COATED ORAL
Qty: 90 TAB | Refills: 3 | Status: SHIPPED | OUTPATIENT
Start: 2019-10-31 | End: 2020-06-22

## 2019-10-31 NOTE — PROGRESS NOTES
1. Have you been to the ER, urgent care clinic since your last visit? Hospitalized since your last visit? No    2. Have you seen or consulted any other health care providers outside of the [address] [place] [address] since your last visit? Include any pap smears or colon screening.  No     Medication refill

## 2019-10-31 NOTE — PATIENT INSTRUCTIONS
Body Mass Index: Care Instructions  Your Care Instructions    Body mass index (BMI) can help you see if your weight is raising your risk for health problems. It uses a formula to compare how much you weigh with how tall you are. · A BMI lower than 18.5 is considered underweight. · A BMI between 18.5 and 24.9 is considered healthy. · A BMI between 25 and 29.9 is considered overweight. A BMI of 30 or higher is considered obese. If your BMI is in the normal range, it means that you have a lower risk for weight-related health problems. If your BMI is in the overweight or obese range, you may be at increased risk for weight-related health problems, such as high blood pressure, heart disease, stroke, arthritis or joint pain, and diabetes. If your BMI is in the underweight range, you may be at increased risk for health problems such as fatigue, lower protection (immunity) against illness, muscle loss, bone loss, hair loss, and hormone problems. BMI is just one measure of your risk for weight-related health problems. You may be at higher risk for health problems if you are not active, you eat an unhealthy diet, or you drink too much alcohol or use tobacco products. Follow-up care is a key part of your treatment and safety. Be sure to make and go to all appointments, and call your doctor if you are having problems. It's also a good idea to know your test results and keep a list of the medicines you take. How can you care for yourself at home? · Practice healthy eating habits. This includes eating plenty of fruits, vegetables, whole grains, lean protein, and low-fat dairy. · If your doctor recommends it, get more exercise. Walking is a good choice. Bit by bit, increase the amount you walk every day. Try for at least 30 minutes on most days of the week. · Do not smoke. Smoking can increase your risk for health problems. If you need help quitting, talk to your doctor about stop-smoking programs and medicines. These can increase your chances of quitting for good. · Limit alcohol to 2 drinks a day for men and 1 drink a day for women. Too much alcohol can cause health problems. If you have a BMI higher than 25  · Your doctor may do other tests to check your risk for weight-related health problems. This may include measuring the distance around your waist. A waist measurement of more than 40 inches in men or 35 inches in women can increase the risk of weight-related health problems. · Talk with your doctor about steps you can take to stay healthy or improve your health. You may need to make lifestyle changes to lose weight and stay healthy, such as changing your diet and getting regular exercise. If you have a BMI lower than 18.5  · Your doctor may do other tests to check your risk for health problems. · Talk with your doctor about steps you can take to stay healthy or improve your health. You may need to make lifestyle changes to gain or maintain weight and stay healthy, such as getting more healthy foods in your diet and doing exercises to build muscle. Where can you learn more? Go to http://yung-johan.info/. Enter S176 in the search box to learn more about \"Body Mass Index: Care Instructions. \"  Current as of: October 13, 2016  Content Version: 11.4  © 0591-3090 [employer]. Care instructions adapted under license by [employer] (which disclaims liability or warranty for this information). If you have questions about a medical condition or this instruction, always ask your healthcare professional. [address] any warranty or liability for your use of this information.

## 2019-10-31 NOTE — PROGRESS NOTES
SPORTS MEDICINE AND PRIMARY CARE  Foreign Ryan MD, [address] [address]  Phone:  [phone]  Fax: [phone]       Chief Complaint   Patient presents with    Medication Refill   . SUBJECTIVE:    Mallika Ortega is a 40 y.o. female Patient returns today with history of obesity, scoliosis, thyroid nodule, sleeve gastric bypass 04/18/17 by Adriana Degroot, back pain that she attributes to the heaviness of her breasts, and is seen for evaluation. Current Outpatient Medications   Medication Sig Dispense Refill    sertraline (ZOLOFT) 100 mg tablet TAKE 1 TABLET BY MOUTH EVERY DAY 90 Tab 3    ALPRAZolam (XANAX) 0.25 mg tablet Take 1 Tab by mouth two (2) times daily as needed for Anxiety. 60 Tab 2    levonorgestrel-ethinyl estradiol (LILLOW, 28,) 0.15-0.03 mg tab TAKE 1 TABLET BY MOUTH EVERY DAY 84 Tab 4    SUMAtriptan (IMITREX) 50 mg tablet TK 1 T PO BID PRN 12 Tab 11    FAMOTIDINE (PEPCID PO) Take  by mouth. Past Medical History:   Diagnosis Date    Abnormal Pap smear     2006-recheck and was normal    Asthma     Asthma     Proventil inhaler PRN    Depression with anxiety 05/29/2018    Depression with anxiety     Endometriosis     fredy valerio md    Family history of ovarian cancer 5/1/2014    Fibroids     Gastritis 05/16/2018    EGD -  angel Ewing md    GERD (gastroesophageal reflux disease)     reflux    H/O gastric bypass 05/16/2018    gastric bypass angel ewing [facility] - [facility]    Heart abnormalities     heart mumor     Hx of gastric bypass 10/21/2015    laparoscopic sleeve gastrectomy - inital weight 266 - Angel Ewing md    HX OTHER MEDICAL     migraines    Migraines 6/29/2011    Obesity (BMI 30-39. 9)     Scoliosis of thoracic spine 03/01/2010    mri    Thyroid nodule     migraines     Past Surgical History:   Procedure Laterality Date    HX CHOLECYSTECTOMY  04/20/2013    [facility] kevin [facility] 5 %/16/2018    HX GI      HX MYOMECTOMY  5/17/10    HX OTHER SURGICAL      pilonidal cyst    HX OTHER SURGICAL      tonsillectomy    HX OTHER SURGICAL      cyst removed lip    HX OTHER SURGICAL      fibroids removed    MYOMECTOMY 1-4,W/TOT 250GMS/<,ABD [facility]  5/17/10     Allergies   Allergen Reactions    Pertussis Vaccines Other (comments)     Shortness of breathe    Mouth Cleanser [Carbamide Peroxide] Hives     glyoxide    Pineapple Hives    Rondec [Brompheniramine-Pseudoephedrin] Hives    Shellfish Containing Products Hives    Tetracycline Hives         REVIEW OF SYSTEMS:  General: negative for - chills or fever  ENT: negative for - headaches, nasal congestion or tinnitus  Respiratory: negative for - cough, hemoptysis, shortness of breath or wheezing  Cardiovascular : negative for - chest pain, edema, palpitations or shortness of breath  Gastrointestinal: negative for - abdominal pain, blood in stools, heartburn or nausea/vomiting  Genito-Urinary: no dysuria, trouble voiding, or hematuria  Musculoskeletal: negative for - gait disturbance, joint pain, joint stiffness or joint swelling  Neurological: no TIA or stroke symptoms  Hematologic: no bruises, no bleeding, no swollen glands  Integument: no lumps, mole changes, nail changes or rash  Endocrine: no malaise/lethargy or unexpected weight changes      Social History     Socioeconomic History    Marital status:      Spouse name: Not on file    Number of children: Not on file    Years of education: Not on file    Highest education level: Not on file   Tobacco Use    Smoking status: Never Smoker    Smokeless tobacco: Never Used   Substance and Sexual Activity    Alcohol use: Yes     Comment: occasional    Drug use: No    Sexual activity: Yes     Partners: Male     Birth control/protection: None, Pill   Social History Narrative    Habits:  She is a never smoker. Never drinker, and does not abuse drugs.         Social History:  The patient is  to her second  of 5 years duration and now    She has one child who is [de-identified] years old, a son. 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 She was going to [employer] but she is considering going to [employer]. Family History:  Father is alive and well. Mother  at age 64 of ventricular atrial fibrillation. Other medical problems included end stage renal disease on dialysis, diabetes, heart disease, obesity. She has two sisters, alive and well. Family History   Problem Relation Age of Onset    Heart Disease Mother     Cancer Mother         ovarian cancer    Breast Cancer Mother         ? age       OBJECTIVE:    Visit Vitals  /76   Pulse 70   Temp 97.9 °F (36.6 °C) (Oral)   Resp 18   Ht 5' 7\" (1.702 m)   Wt 203 lb 6.4 oz (92.3 kg)   SpO2 98%   BMI 31.86 kg/m²     CONSTITUTIONAL: well , well nourished, appears age appropriate  EYES: perrla, eom intact  ENMT:moist mucous membranes, pharynx clear  NECK: supple. Thyroid normal  RESPIRATORY: Chest: clear bilaterally   CARDIOVASCULAR: Heart: regular rate and rhythm  GASTROINTESTINAL: Abdomen: soft, bowel sounds active  HEMATOLOGIC: no pathological lymph nodes palpated  MUSCULOSKELETAL: Extremities: no edema, pulse 1+   INTEGUMENT: No unusual rashes or suspicious skin lesions noted. Nails appear normal.  NEUROLOGIC: non-focal exam   MENTAL STATUS: alert and oriented, appropriate affect           ASSESSMENT:  1. Obesity (BMI 30-39.9)    2. Depression with anxiety    3. Scoliosis of thoracic spine, unspecified scoliosis type    4. Thyroid nodule    5. Hx of gastric bypass      Patient's medical status is stable. She is a healthy 80-year-old female, however, we are extremely disappointed that she allowed stress to stop her excellent weight loss she was having. She claims she is going to get back on the wagon and lose another 30 pounds.   This can easily be accomplished because of what she has accomplished so far. We give her great encouragement. She is on Zoloft for depression and Xanax for anxiety. Neither are abused. The anxiety is related to her 10year-old son and his father, with whom she is , and his inappropriate behavior, maybe sees him every two weeks for five minutes, stressing the child out so much that she has him in counseling. She complains of back pain, which is probably a combination of scoliosis, as well as heaviness of her breasts. We encouraged physical activity, which may help some of the discomfort. Thyroid nodule by palpation is unchanged, will confirm with TSH. With her history of gastric bypass, she should be successful in allowing us to see her 30 pounds less on her next visit. She will be back to see us in one year, sooner if she has any problems. I have discussed the diagnosis with the patient and the intended plan as seen in the  orders above. The patient understands and agees with the plan. The patient has   received an after visit summary and questions were answered concerning  future plans  Patient labs and/or xrays were reviewed  Past records were reviewed. PLAN:  .  Orders Placed This Encounter    URINALYSIS W/ RFLX MICROSCOPIC    CBC WITH AUTOMATED DIFF    METABOLIC PANEL, COMPREHENSIVE    LIPID PANEL    TSH 3RD GENERATION    HEMOGLOBIN A1C WITH EAG    sertraline (ZOLOFT) 100 mg tablet    ALPRAZolam (XANAX) 0.25 mg tablet       Follow-up and Dispositions    · Return in about 1 year (around 10/31/2020). ATTENTION:   This medical record was transcribed using an electronic medical records system. Although proofread, it may and can contain electronic and spelling errors. Other human spelling and other errors may be present. Corrections may be executed at a later time. Please feel free to contact us for any clarifications as needed.

## 2019-11-01 LAB
ALBUMIN SERPL-MCNC: 4.2 G/DL (ref 3.5–5.5)
ALBUMIN/GLOB SERPL: 1.6 {RATIO} (ref 1.2–2.2)
ALP SERPL-CCNC: 93 IU/L (ref 39–117)
ALT SERPL-CCNC: 12 IU/L (ref 0–32)
APPEARANCE UR: CLEAR
AST SERPL-CCNC: 15 IU/L (ref 0–40)
BASOPHILS # BLD AUTO: 0.1 X10E3/UL (ref 0–0.2)
BASOPHILS NFR BLD AUTO: 1 %
BILIRUB SERPL-MCNC: <0.2 MG/DL (ref 0–1.2)
BILIRUB UR QL STRIP: NEGATIVE
BUN SERPL-MCNC: 9 MG/DL (ref 6–24)
BUN/CREAT SERPL: 15 (ref 9–23)
CALCIUM SERPL-MCNC: 8.8 MG/DL (ref 8.7–10.2)
CHLORIDE SERPL-SCNC: 105 MMOL/L (ref 96–106)
CHOLEST SERPL-MCNC: 179 MG/DL (ref 100–199)
CO2 SERPL-SCNC: 21 MMOL/L (ref 20–29)
COLOR UR: YELLOW
CREAT SERPL-MCNC: 0.62 MG/DL (ref 0.57–1)
EOSINOPHIL # BLD AUTO: 0.1 X10E3/UL (ref 0–0.4)
EOSINOPHIL NFR BLD AUTO: 1 %
ERYTHROCYTE [DISTWIDTH] IN BLOOD BY AUTOMATED COUNT: 12.9 % (ref 12.3–15.4)
EST. AVERAGE GLUCOSE BLD GHB EST-MCNC: 111 MG/DL
GLOBULIN SER CALC-MCNC: 2.6 G/DL (ref 1.5–4.5)
GLUCOSE SERPL-MCNC: 82 MG/DL (ref 65–99)
GLUCOSE UR QL: NEGATIVE
HBA1C MFR BLD: 5.5 % (ref 4.8–5.6)
HCT VFR BLD AUTO: 35 % (ref 34–46.6)
HDLC SERPL-MCNC: 67 MG/DL
HGB BLD-MCNC: 11.3 G/DL (ref 11.1–15.9)
HGB UR QL STRIP: NEGATIVE
IMM GRANULOCYTES # BLD AUTO: 0 X10E3/UL (ref 0–0.1)
IMM GRANULOCYTES NFR BLD AUTO: 0 %
KETONES UR QL STRIP: NEGATIVE
LDLC SERPL CALC-MCNC: 89 MG/DL (ref 0–99)
LEUKOCYTE ESTERASE UR QL STRIP: NEGATIVE
LYMPHOCYTES # BLD AUTO: 1.7 X10E3/UL (ref 0.7–3.1)
LYMPHOCYTES NFR BLD AUTO: 38 %
MCH RBC QN AUTO: 28.2 PG (ref 26.6–33)
MCHC RBC AUTO-ENTMCNC: 32.3 G/DL (ref 31.5–35.7)
MCV RBC AUTO: 87 FL (ref 79–97)
MICRO URNS: NORMAL
MONOCYTES # BLD AUTO: 0.5 X10E3/UL (ref 0.1–0.9)
MONOCYTES NFR BLD AUTO: 11 %
NEUTROPHILS # BLD AUTO: 2.2 X10E3/UL (ref 1.4–7)
NEUTROPHILS NFR BLD AUTO: 49 %
NITRITE UR QL STRIP: NEGATIVE
PH UR STRIP: 6 [PH] (ref 5–7.5)
PLATELET # BLD AUTO: 354 X10E3/UL (ref 150–450)
POTASSIUM SERPL-SCNC: 4.6 MMOL/L (ref 3.5–5.2)
PROT SERPL-MCNC: 6.8 G/DL (ref 6–8.5)
PROT UR QL STRIP: NEGATIVE
RBC # BLD AUTO: 4.01 X10E6/UL (ref 3.77–5.28)
SODIUM SERPL-SCNC: 139 MMOL/L (ref 134–144)
SP GR UR: 1.02 (ref 1–1.03)
TRIGL SERPL-MCNC: 116 MG/DL (ref 0–149)
TSH SERPL DL<=0.005 MIU/L-ACNC: 2.75 UIU/ML (ref 0.45–4.5)
UROBILINOGEN UR STRIP-MCNC: 0.2 MG/DL (ref 0.2–1)
VLDLC SERPL CALC-MCNC: 23 MG/DL (ref 5–40)
WBC # BLD AUTO: 4.5 X10E3/UL (ref 3.4–10.8)

## 2020-05-26 ENCOUNTER — HOSPITAL ENCOUNTER (OUTPATIENT)
Dept: MAMMOGRAPHY | Age: 45
Discharge: HOME | End: 2020-05-26
Attending: INTERNAL MEDICINE
Payer: COMMERCIAL

## 2020-05-26 DIAGNOSIS — Z12.31 VISIT FOR SCREENING MAMMOGRAM: ICD-10-CM

## 2020-05-26 PROCEDURE — 77067 SCR MAMMO BI INCL CAD: CPT

## 2020-06-22 ENCOUNTER — TELEPHONE (OUTPATIENT)
Dept: OBGYN CLINIC | Age: 45
End: 2020-06-22

## 2020-06-22 DIAGNOSIS — Z01.419 ENCOUNTER FOR ROUTINE GYNECOLOGICAL EXAMINATION WITH PAPANICOLAOU SMEAR OF CERVIX: ICD-10-CM

## 2020-06-22 DIAGNOSIS — F41.8 DEPRESSION WITH ANXIETY: ICD-10-CM

## 2020-06-22 RX ORDER — LEVONORGESTREL AND ETHINYL ESTRADIOL 0.15-0.03
KIT ORAL
Qty: 1 PACKAGE | Refills: 0 | Status: SHIPPED | OUTPATIENT
Start: 2020-06-22 | End: 2020-07-27 | Stop reason: SDUPTHER

## 2020-06-22 RX ORDER — SERTRALINE HYDROCHLORIDE 100 MG/1
TABLET, FILM COATED ORAL
Qty: 90 TAB | Refills: 3 | Status: SHIPPED | OUTPATIENT
Start: 2020-06-22 | End: 2021-07-14

## 2020-06-22 NOTE — TELEPHONE ENCOUNTER
Patient needed to make appt with new doctor since she transferred from [facility]. Scheduled for July 14 2020    Needs pack to hold her over until AE      [address]  .     [facility]  [id number]

## 2020-07-27 ENCOUNTER — OFFICE VISIT (OUTPATIENT)
Dept: OBGYN CLINIC | Age: 45
End: 2020-07-27

## 2020-07-27 VITALS
DIASTOLIC BLOOD PRESSURE: 76 MMHG | BODY MASS INDEX: 34.06 KG/M2 | SYSTOLIC BLOOD PRESSURE: 128 MMHG | HEIGHT: 67 IN | WEIGHT: 217 LBS

## 2020-07-27 DIAGNOSIS — Z01.419 ENCOUNTER FOR GYNECOLOGICAL EXAMINATION WITHOUT ABNORMAL FINDING: Primary | ICD-10-CM

## 2020-07-27 DIAGNOSIS — Z01.419 ENCOUNTER FOR ROUTINE GYNECOLOGICAL EXAMINATION WITH PAPANICOLAOU SMEAR OF CERVIX: ICD-10-CM

## 2020-07-27 RX ORDER — LEVONORGESTREL AND ETHINYL ESTRADIOL 0.15-0.03
KIT ORAL
Qty: 3 PACKAGE | Refills: 3 | Status: SHIPPED | OUTPATIENT
Start: 2020-07-27 | End: 2020-08-05 | Stop reason: SDUPTHER

## 2020-07-27 NOTE — PROGRESS NOTES
Annual exam    Ashlie Cohen is a 39 y.o. presenting for annual exam.   Her main concerns today include refill on her birth control. Ob/Gyn Hx:    Patient's last menstrual period was 2020 (approximate). Menses- regular monthly cycles? no, Bothersome? no  Contraception-pills  STI- declined testing today  SA-yes     Health maintenance:  Pap-2017 negative but No HPV run  Mammo-2020 normal per patient. Colonoscopy- N/A  Gardasil-  Elo Personal Genetic Risk Assessment: positive, received counseling. Past Medical History:   Diagnosis Date    Abnormal Pap smear     -recheck and was normal    Asthma     Asthma     Proventil inhaler PRN    Depression with anxiety 2018    Depression with anxiety     Endometriosis     fredy valerio md    Family history of ovarian cancer 2014    Fibroids     Gastritis 2018    EGD -  angel Ball md    GERD (gastroesophageal reflux disease)     reflux    H/O gastric bypass 2018    gastric bypass angel ball [facility] - [facility]    Heart abnormalities     heart mumor     Hx of gastric bypass 10/21/2015    laparoscopic sleeve gastrectomy - inital weight 266 - Angel Ball md    HX OTHER MEDICAL     migraines    Migraines 2011    Obesity (BMI 30-39. 9)     Scoliosis of thoracic spine 2010    mri    Thyroid nodule     migraines       Past Surgical History:   Procedure Laterality Date    HX BREAST BIOPSY Left     HX CHOLECYSTECTOMY  2013    lap kevin at [facility] HX GASTRIC BYPASS      HX GI      HX MYOMECTOMY  5/17/10    HX OTHER SURGICAL      pilonidal cyst    HX OTHER SURGICAL      tonsillectomy    HX OTHER SURGICAL      cyst removed lip    HX OTHER SURGICAL      fibroids removed    MYOMECTOMY 1-4,W/TOT 250GMS/<,ABD [facility]  5/17/10       Family History   Problem Relation Age of Onset    Heart Disease Mother     Cancer Mother         ovarian cancer    Breast Cancer Mother ? age       Social History     Socioeconomic History    Marital status:      Spouse name: Not on file    Number of children: Not on file    Years of education: Not on file    Highest education level: Not on file   Occupational History    Not on file   Social Needs    Financial resource strain: Not on file    Food insecurity     Worry: Not on file     Inability: Not on file    Transportation needs     Medical: Not on file     Non-medical: Not on file   Tobacco Use    Smoking status: Never Smoker    Smokeless tobacco: Never Used   Substance and Sexual Activity    Alcohol use: Yes     Comment: occasional    Drug use: No    Sexual activity: Yes     Partners: Male     Birth control/protection: Pill   Lifestyle    Physical activity     Days per week: Not on file     Minutes per session: Not on file    Stress: Not on file   Relationships    Social connections     Talks on phone: Not on file     Gets together: Not on file     Attends [employer] service: Not on file     Active member of club or organization: Not on file     Attends meetings of clubs or organizations: Not on file     Relationship status: Not on file    Intimate partner violence     Fear of current or ex partner: Not on file     Emotionally abused: Not on file     Physically abused: Not on file     Forced sexual activity: Not on file   Other Topics Concern    Not on file   Social History Narrative    Habits:  She is a never smoker. Never drinker, and does not abuse drugs. Social History:  The patient is  to her second  of 5 years duration and now    She has one child who is [de-identified] years old, a son. 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 She was going to [email or web address] but she is considering going to [employer]. Family History:  Father is alive and well. Mother  at age 64 of ventricular atrial fibrillation.   Other medical problems included end stage renal disease on dialysis, diabetes, heart disease, obesity. She has two sisters, alive and well. Current Outpatient Medications   Medication Sig Dispense Refill    levonorgestrel-ethinyl estradiol (Lillow, 28,) 0.15-0.03 mg tab TAKE 1 TABLET BY MOUTH EVERY DAY 1 Package 0    sertraline (ZOLOFT) 100 mg tablet TAKE 1 TABLET BY MOUTH EVERY DAY 90 Tab 3    ALPRAZolam (XANAX) 0.25 mg tablet Take 1 Tab by mouth two (2) times daily as needed for Anxiety. 60 Tab 2    SUMAtriptan (IMITREX) 50 mg tablet TK 1 T PO BID PRN 12 Tab 11    FAMOTIDINE (PEPCID PO) Take  by mouth.          Allergies   Allergen Reactions    Pertussis Vaccines Other (comments)     Shortness of breathe    Mouth Cleanser [Carbamide Peroxide] Hives     glyoxide    Pineapple Hives    Rondec [Brompheniramine-Pseudoephedrin] Hives    Shellfish Containing Products Hives    Tetracycline Hives       Review of Systems - History obtained from the patient  Constitutional: negative for weight loss, fever, night sweats  HEENT: negative for hearing loss, earache, congestion, snoring, sorethroat  CV: negative for chest pain, palpitations, edema  Resp: negative for cough, shortness of breath, wheezing  GI: negative for change in bowel habits, abdominal pain, black or bloody stools  : negative for frequency, dysuria, hematuria, vaginal discharge  MSK: negative for back pain, joint pain, muscle pain  Breast: negative for breast lumps, nipple discharge, galactorrhea  Skin :negative for itching, rash, hives  Neuro: negative for dizziness, headache, confusion, weakness  Psych: negative for anxiety, depression, change in mood  Heme/lymph: negative for bleeding, bruising, pallor    Physical Exam    Visit Vitals  /76 (BP 1 Location: Left arm, BP Patient Position: Sitting)   Ht 5' 7\" (1.702 m)   Wt 217 lb (98.4 kg)   LMP 05/27/2020 (Approximate)   BMI 33.99 kg/m²       Constitutional  · Appearance: well-nourished, well developed, alert, in no acute distress    HENT  · Head and Face: appears normal    Neck  · Inspection/Palpation: normal appearance, no masses or tenderness  · Lymph Nodes: no lymphadenopathy present  · Thyroid: gland size normal, nontender, no nodules or masses present on palpation    Chest  · Respiratory Effort: non-labored breathing  · Auscultation: CTAB, normal breath sounds    Cardiovascular  · Heart:  · Auscultation: regular rate and rhythm without murmur  · Extremities: no peripheral edema    Breasts  · Inspection of Breasts: breasts symmetrical, no skin changes, no discharge present, nipple appearance normal, no skin retraction present  · Palpation of Breasts and Axillae: no masses present on palpation, no breast tenderness  · Axillary Lymph Nodes: no lymphadenopathy present    Gastrointestinal  · Abdominal Examination: abdomen non-tender to palpation, normal bowel sounds, no masses present  · Liver and spleen: no hepatomegaly present, spleen not palpable  · Hernias: no hernias identified    Genitourinary  · External Genitalia: normal appearance for age, no discharge present, no tenderness present, no inflammatory lesions present, no masses present, no atrophy present  · Vagina: normal vaginal vault without central or paravaginal defects, no discharge present, no inflammatory lesions present, no masses present  · Bladder: non-tender to palpation  · Urethra: appears normal  · Cervix: normal   · Uterus: normal size, shape and consistency  · Adnexa: no adnexal tenderness present, no adnexal masses present  · Perineum: perineum within normal limits, no evidence of trauma, no rashes or skin lesions present    Skin  · General Inspection: no rash, no lesions identified    Neurologic/Psychiatric  · Mental Status:  · Orientation: grossly oriented to person, place and time  · Mood and Affect: mood normal, affect appropriate      Assessment/Plan:  39 y.o. overall doing well.      Health Maintenance:  -counseled re: diet, exercise, healthy lifestyle  -pap/HPV sent  -STI testing DECLINED  -refer for mammo  OCPs sent    RTC: 1 year for annual wellness assessment, or sooner prn for problems or concerns  -handouts and instructions provided    Sondra Matos  7/27/2020  9:59 AM     Signed By: Miladis Valencia MD     July 27, 2020

## 2020-08-05 DIAGNOSIS — Z01.419 ENCOUNTER FOR ROUTINE GYNECOLOGICAL EXAMINATION WITH PAPANICOLAOU SMEAR OF CERVIX: ICD-10-CM

## 2020-08-05 LAB
CYTOLOGIST CVX/VAG CYTO: NORMAL
CYTOLOGY CVX/VAG DOC CYTO: NORMAL
CYTOLOGY CVX/VAG DOC THIN PREP: NORMAL
DX ICD CODE: NORMAL
HPV I/H RISK 1 DNA CVX QL PROBE+SIG AMP: NEGATIVE
Lab: NORMAL
OTHER STN SPEC: NORMAL
STAT OF ADQ CVX/VAG CYTO-IMP: NORMAL

## 2020-08-05 RX ORDER — LEVONORGESTREL AND ETHINYL ESTRADIOL 0.15-0.03
KIT ORAL
Qty: 3 PACKAGE | Refills: 3 | Status: SHIPPED | OUTPATIENT
Start: 2020-08-05 | End: 2021-06-14

## 2021-05-14 ENCOUNTER — TRANSCRIBE ORDER (OUTPATIENT)
Dept: SCHEDULING | Age: 46
End: 2021-05-14

## 2021-05-14 DIAGNOSIS — Z12.31 SCREENING MAMMOGRAM, ENCOUNTER FOR: Primary | ICD-10-CM

## 2021-06-07 ENCOUNTER — TRANSCRIBE ORDER (OUTPATIENT)
Dept: GENERAL RADIOLOGY | Age: 46
End: 2021-06-07

## 2021-06-07 DIAGNOSIS — Z12.31 SCREENING MAMMOGRAM, ENCOUNTER FOR: Primary | ICD-10-CM

## 2021-06-09 ENCOUNTER — HOSPITAL ENCOUNTER (OUTPATIENT)
Dept: MAMMOGRAPHY | Age: 46
Discharge: HOME | End: 2021-06-09
Attending: OBSTETRICS & GYNECOLOGY
Payer: COMMERCIAL

## 2021-06-09 DIAGNOSIS — Z12.31 SCREENING MAMMOGRAM, ENCOUNTER FOR: ICD-10-CM

## 2021-06-09 PROCEDURE — 77063 BREAST TOMOSYNTHESIS BI: CPT

## 2021-06-14 ENCOUNTER — TELEPHONE (OUTPATIENT)
Dept: OBGYN CLINIC | Age: 46
End: 2021-06-14

## 2021-06-14 DIAGNOSIS — Z01.419 ENCOUNTER FOR ROUTINE GYNECOLOGICAL EXAMINATION WITH PAPANICOLAOU SMEAR OF CERVIX: ICD-10-CM

## 2021-06-14 RX ORDER — LEVONORGESTREL AND ETHINYL ESTRADIOL 0.15-0.03
KIT ORAL
Qty: 1 PACKAGE | Refills: 1 | Status: SHIPPED | OUTPATIENT
Start: 2021-06-14 | End: 2021-07-01

## 2021-06-14 NOTE — TELEPHONE ENCOUNTER
Last AE 8/5/20    Next AE set for 7/28/21 with [address] for refill of Lillow, 28 to get her through until AE      [facility]

## 2021-06-17 ENCOUNTER — OFFICE VISIT (OUTPATIENT)
Dept: INTERNAL MEDICINE CLINIC | Age: 46
End: 2021-06-17
Payer: COMMERCIAL

## 2021-06-17 VITALS
SYSTOLIC BLOOD PRESSURE: 120 MMHG | HEIGHT: 67 IN | DIASTOLIC BLOOD PRESSURE: 75 MMHG | BODY MASS INDEX: 32.8 KG/M2 | RESPIRATION RATE: 18 BRPM | WEIGHT: 209 LBS | OXYGEN SATURATION: 99 % | HEART RATE: 67 BPM | TEMPERATURE: 98.1 F

## 2021-06-17 DIAGNOSIS — K21.9 GASTROESOPHAGEAL REFLUX DISEASE WITHOUT ESOPHAGITIS: ICD-10-CM

## 2021-06-17 DIAGNOSIS — E66.9 OBESITY (BMI 30-39.9): ICD-10-CM

## 2021-06-17 DIAGNOSIS — E04.1 THYROID NODULE: ICD-10-CM

## 2021-06-17 DIAGNOSIS — Z98.84 HX OF GASTRIC BYPASS: ICD-10-CM

## 2021-06-17 DIAGNOSIS — F41.8 DEPRESSION WITH ANXIETY: Primary | ICD-10-CM

## 2021-06-17 DIAGNOSIS — Z98.84 H/O GASTRIC BYPASS: ICD-10-CM

## 2021-06-17 PROCEDURE — 99214 OFFICE O/P EST MOD 30 MIN: CPT | Performed by: INTERNAL MEDICINE

## 2021-06-17 RX ORDER — BUSPIRONE HYDROCHLORIDE 7.5 MG/1
7.5 TABLET ORAL 3 TIMES DAILY
Qty: 90 TABLET | Refills: 5 | Status: SHIPPED | OUTPATIENT
Start: 2021-06-17 | End: 2021-12-20

## 2021-06-17 NOTE — PROGRESS NOTES
SPORTS MEDICINE AND PRIMARY CARE  Dio Miramontes MD, [address] [address]  Phone:  [phone]  Fax: [phone]       Chief Complaint   Patient presents with    Medication Refill    Anxiety   . SUBJECTIVE:    Keeley Cowart is a 39 y.o. female Patient returns today with a known history of depression with anxiety, history of gastric sleeve gastrectomy 10/21/15, and more recently gastric bypass on 05/16/18, GERD, obesity, and is seen for evaluation. Patient returns today saying she is no longer at [facility], she is at [facility] in [facility]. She feels depressed and anxious. She does not have any particular reason. She is not in a relationship right now, which does not seem to bother her. Her 6year old son is doing well, so she cannot put a finger on what is going on and would like to try something else. She takes Xanax periodically and is seen for evaluation. Current Outpatient Medications   Medication Sig Dispense Refill    busPIRone (BUSPAR) 7.5 mg tablet Take 1 Tablet by mouth three (3) times daily. 90 Tablet 5    levonorgestrel-ethinyl estradiol (Lillow, 28,) 0.15-0.03 mg tab TAKE 1 TABLET BY MOUTH EVERY DAY, SKIP placebo pills as directed 1 Package 1    sertraline (ZOLOFT) 100 mg tablet TAKE 1 TABLET BY MOUTH EVERY DAY 90 Tab 3    FAMOTIDINE (PEPCID PO) Take  by mouth.       SUMAtriptan (IMITREX) 50 mg tablet TK 1 T PO BID PRN (Patient not taking: Reported on 6/17/2021) 12 Tab 11     Past Medical History:   Diagnosis Date    Abnormal Pap smear     2006-recheck and was normal    Asthma     Asthma     Proventil inhaler PRN    Depression with anxiety 05/29/2018    Depression with anxiety     Endometriosis     fredy valerio md    Family history of ovarian cancer 5/1/2014    Fibroids     Gastritis 05/16/2018    EGD -  angel Ball md    GERD (gastroesophageal reflux disease)     reflux    H/O gastric bypass 05/16/2018    gastric bypass angel ball [facility]    Heart abnormalities     heart mumor     Hx of gastric bypass 10/21/2015    laparoscopic sleeve gastrectomy - inital weight 266 - Angel Ball md    HX OTHER MEDICAL     migraines    Migraines 6/29/2011    Obesity (BMI 30-39. 9)     Scoliosis of thoracic spine 03/01/2010    mri    Thyroid nodule     migraines     Past Surgical History:   Procedure Laterality Date    HX BREAST BIOPSY Left     incomplete.  could not locate lump    HX CHOLECYSTECTOMY  04/20/2013    lap kevin at [facility] HX GASTRIC BYPASS  %/16/2018    HX GI      HX MYOMECTOMY  5/17/10    HX OTHER SURGICAL      pilonidal cyst    HX OTHER SURGICAL      tonsillectomy    HX OTHER SURGICAL      cyst removed lip    HX OTHER SURGICAL      fibroids removed    OR MYOMECTOMY 1-4,W/TOT 250GMS/<,ABD [facility]  5/17/10     Allergies   Allergen Reactions    Pertussis Vaccines Other (comments)     Shortness of breathe    Mouth Cleanser [Carbamide Peroxide] Hives     glyoxide    Pineapple Hives    Rondec [Brompheniramine-Pseudoephedrin] Hives    Shellfish Containing Products Hives    Tetracycline Hives         REVIEW OF SYSTEMS:  General: negative for - chills or fever  ENT: negative for - headaches, nasal congestion or tinnitus  Respiratory: negative for - cough, hemoptysis, shortness of breath or wheezing  Cardiovascular : negative for - chest pain, edema, palpitations or shortness of breath  Gastrointestinal: negative for - abdominal pain, blood in stools, heartburn or nausea/vomiting  Genito-Urinary: no dysuria, trouble voiding, or hematuria  Musculoskeletal: negative for - gait disturbance, joint pain, joint stiffness or joint swelling  Neurological: no TIA or stroke symptoms  Hematologic: no bruises, no bleeding, no swollen glands  Integument: no lumps, mole changes, nail changes or rash  Endocrine: no malaise/lethargy or unexpected weight changes      Social History     Socioeconomic History    Marital status:      Spouse name: Not on file    Number of children: Not on file    Years of education: Not on file    Highest education level: Not on file   Tobacco Use    Smoking status: Never Smoker    Smokeless tobacco: Never Used   Substance and Sexual Activity    Alcohol use: Yes     Comment: occasional    Drug use: No    Sexual activity: Yes     Partners: Male     Birth control/protection: Pill   Social History Narrative    Habits:  She is a never smoker. Never drinker, and does not abuse drugs. Social History:  The patient is  to her second  of 5 years duration and now    She has one child who is 6years old, a son. 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 She was going to [employer] but she is considering going to [facility]. Family History:  Father is alive and well. Mother  at age 64 of ventricular atrial fibrillation. Other medical problems included end stage renal disease on dialysis, diabetes, heart disease, obesity. She has two sisters, alive and well. Social Determinants of Health     Financial Resource Strain:     Difficulty of Paying Living Expenses:    Food Insecurity:     Worried About Running Out of Food in the Last Year:     [address] in the Last Year:    Transportation Needs:     Lack of Transportation (Medical):      Lack of Transportation (Non-Medical):    Physical Activity:     Days of Exercise per Week:     Minutes of Exercise per Session:    Stress:     Feeling of Stress :    Social Connections:     Frequency of Communication with Friends and Family:     Frequency of Social Gatherings with Friends and Family:     Attends [employer] Services:     Active Member of Clubs or Organizations:     Attends Club or Organization Meetings:     Marital Status:      Family History   Problem Relation Age of Onset    Heart Disease Mother     Cancer Mother ovarian cancer    Breast Cancer Mother         unsure of breast ca diagnosis    Ovarian Cancer Mother        OBJECTIVE:    Visit Vitals  /75 (BP 1 Location: Right arm, BP Patient Position: Sitting)   Pulse 67   Temp 98.1 °F (36.7 °C) (Oral)   Resp 18   Ht 5' 7\" (1.702 m)   Wt 209 lb (94.8 kg)   SpO2 99%   BMI 32.73 kg/m²     CONSTITUTIONAL: well , well nourished, appears age appropriate  EYES: perrla, eom intact  ENMT:moist mucous membranes, pharynx clear  NECK: supple. Thyroid normal  RESPIRATORY: Chest: clear bilaterally   CARDIOVASCULAR: Heart: regular rate and rhythm  GASTROINTESTINAL: Abdomen: soft, bowel sounds active  HEMATOLOGIC: no pathological lymph nodes palpated  MUSCULOSKELETAL: Extremities: no edema, pulse 1+   INTEGUMENT: No unusual rashes or suspicious skin lesions noted. Nails appear normal.  NEUROLOGIC: non-focal exam   MENTAL STATUS: alert and oriented, appropriate affect           ASSESSMENT:  1. Depression with anxiety    2. H/O gastric bypass    3. Hx of gastric bypass    4. Gastroesophageal reflux disease without esophagitis    5. Obesity (BMI 30-39. 9)      She is status post gastric bypass and gastric sleeve and is doing amazingly well with regards to her weight. Her BMI is down to 32.73. She has lost 9 pounds since we last saw her and wants to get down to 180. We completely agree with her. We encouraged continued weight reduction, as well as increase in her physical activity for 30 minutes five days a week. She has depression with anxiety and I am not for sure of its etiology. She would like to continue Zoloft. We would like her to stop Xanax as I am concerned about the benzodiazepine. She is wiling to do that. We will put her on BuSpar 7.5 mg three times a day and see if that will perk her spirits up. She cannot put her finger on what is going on. Her son is doing well in school, she likes her job.     There is a history of GERD, which is improved now, particularly with the weight reduction. She has Pepcid if needed. As discussed above, the weight is just melting away and we are amazed and we congratulate her. The next time we see her, if she maintains this rate, she will no longer be in the 200 club and will no longer be obese. She may be overweight, but not obese. She will be back to see me in four months. She will give me a call if she has issues before then, or if she finds the BuSpar is not working, then we have other options. We offer psychiatric referral, but she does not want that. I have discussed the diagnosis with the patient and the intended plan as seen in the  Orders. The patient understands and agees with the plan. The patient has   received an after visit summary and questions were answered concerning  future plans  Patient labs and/or xrays were reviewed  Past records were reviewed. PLAN:  .  Orders Placed This Encounter    URINALYSIS W/ RFLX MICROSCOPIC    CBC WITH AUTOMATED DIFF    METABOLIC PANEL, COMPREHENSIVE    LIPID PANEL    TSH 3RD GENERATION    HEMOGLOBIN A1C WITH EAG    busPIRone (BUSPAR) 7.5 mg tablet       Follow-up and Dispositions    · Return in about 4 months (around 10/17/2021). ATTENTION:   This medical record was transcribed using an electronic medical records system. Although proofread, it may and can contain electronic and spelling errors. Other human spelling and other errors may be present. Corrections may be executed at a later time. Please feel free to contact us for any clarifications as needed.

## 2021-06-17 NOTE — PROGRESS NOTES
Chano Rosales is a 39 y.o. female    Chief Complaint   Patient presents with    Medication Refill    Anxiety     1. Have you been to the ER, urgent care clinic since your last visit? Hospitalized since your last visit? No    2. Have you seen or consulted any other health care providers outside of the [address] [place] [address] since your last visit? Include any pap smears or colon screening.   No

## 2021-06-18 LAB
ALBUMIN SERPL-MCNC: 4.1 G/DL (ref 3.8–4.8)
ALBUMIN/GLOB SERPL: 1.4 {RATIO} (ref 1.2–2.2)
ALP SERPL-CCNC: 90 IU/L (ref 48–121)
ALT SERPL-CCNC: 8 IU/L (ref 0–32)
AST SERPL-CCNC: 17 IU/L (ref 0–40)
BASOPHILS # BLD AUTO: 0 X10E3/UL (ref 0–0.2)
BASOPHILS NFR BLD AUTO: 1 %
BILIRUB SERPL-MCNC: 0.2 MG/DL (ref 0–1.2)
BUN SERPL-MCNC: 6 MG/DL (ref 6–24)
BUN/CREAT SERPL: 11 (ref 9–23)
CALCIUM SERPL-MCNC: 9 MG/DL (ref 8.7–10.2)
CHLORIDE SERPL-SCNC: 107 MMOL/L (ref 96–106)
CHOLEST SERPL-MCNC: 158 MG/DL (ref 100–199)
CO2 SERPL-SCNC: 20 MMOL/L (ref 20–29)
CREAT SERPL-MCNC: 0.54 MG/DL (ref 0.57–1)
EOSINOPHIL # BLD AUTO: 0 X10E3/UL (ref 0–0.4)
EOSINOPHIL NFR BLD AUTO: 1 %
ERYTHROCYTE [DISTWIDTH] IN BLOOD BY AUTOMATED COUNT: 15.8 % (ref 11.7–15.4)
EST. AVERAGE GLUCOSE BLD GHB EST-MCNC: 108 MG/DL
GLOBULIN SER CALC-MCNC: 3 G/DL (ref 1.5–4.5)
GLUCOSE SERPL-MCNC: 82 MG/DL (ref 65–99)
HBA1C MFR BLD: 5.4 % (ref 4.8–5.6)
HCT VFR BLD AUTO: 31.5 % (ref 34–46.6)
HDLC SERPL-MCNC: 62 MG/DL
HGB BLD-MCNC: 9.8 G/DL (ref 11.1–15.9)
IMM GRANULOCYTES # BLD AUTO: 0 X10E3/UL (ref 0–0.1)
IMM GRANULOCYTES NFR BLD AUTO: 0 %
LDLC SERPL CALC-MCNC: 78 MG/DL (ref 0–99)
LYMPHOCYTES # BLD AUTO: 1.2 X10E3/UL (ref 0.7–3.1)
LYMPHOCYTES NFR BLD AUTO: 31 %
MCH RBC QN AUTO: 25.9 PG (ref 26.6–33)
MCHC RBC AUTO-ENTMCNC: 31.1 G/DL (ref 31.5–35.7)
MCV RBC AUTO: 83 FL (ref 79–97)
MONOCYTES # BLD AUTO: 0.4 X10E3/UL (ref 0.1–0.9)
MONOCYTES NFR BLD AUTO: 10 %
NEUTROPHILS # BLD AUTO: 2.3 X10E3/UL (ref 1.4–7)
NEUTROPHILS NFR BLD AUTO: 57 %
PLATELET # BLD AUTO: 297 X10E3/UL (ref 150–450)
POTASSIUM SERPL-SCNC: 4.4 MMOL/L (ref 3.5–5.2)
PROT SERPL-MCNC: 7.1 G/DL (ref 6–8.5)
RBC # BLD AUTO: 3.79 X10E6/UL (ref 3.77–5.28)
SODIUM SERPL-SCNC: 141 MMOL/L (ref 134–144)
TRIGL SERPL-MCNC: 97 MG/DL (ref 0–149)
TSH SERPL DL<=0.005 MIU/L-ACNC: 1.52 UIU/ML (ref 0.45–4.5)
VLDLC SERPL CALC-MCNC: 18 MG/DL (ref 5–40)
WBC # BLD AUTO: 4.1 X10E3/UL (ref 3.4–10.8)

## 2021-06-18 RX ORDER — FERROUS GLUCONATE 324(37.5)
324 TABLET ORAL EVERY OTHER DAY
Qty: 30 TABLET | Refills: 11 | Status: SHIPPED | OUTPATIENT
Start: 2021-06-18

## 2021-06-23 ENCOUNTER — HOSPITAL ENCOUNTER (OUTPATIENT)
Dept: ULTRASOUND IMAGING | Age: 46
Discharge: HOME | End: 2021-06-23
Attending: INTERNAL MEDICINE
Payer: COMMERCIAL

## 2021-06-23 PROCEDURE — 76536 US EXAM OF HEAD AND NECK: CPT

## 2021-07-01 DIAGNOSIS — Z01.419 ENCOUNTER FOR ROUTINE GYNECOLOGICAL EXAMINATION WITH PAPANICOLAOU SMEAR OF CERVIX: ICD-10-CM

## 2021-07-01 RX ORDER — LEVONORGESTREL AND ETHINYL ESTRADIOL 0.15-0.03
KIT ORAL
Qty: 28 TABLET | Refills: 1 | Status: SHIPPED | OUTPATIENT
Start: 2021-07-01 | End: 2021-07-28 | Stop reason: SDUPTHER

## 2021-07-14 DIAGNOSIS — F41.8 DEPRESSION WITH ANXIETY: ICD-10-CM

## 2021-07-14 RX ORDER — SERTRALINE HYDROCHLORIDE 100 MG/1
TABLET, FILM COATED ORAL
Qty: 30 TABLET | Refills: 11 | Status: SHIPPED | OUTPATIENT
Start: 2021-07-14 | End: 2022-07-13 | Stop reason: SDUPTHER

## 2021-07-28 ENCOUNTER — OFFICE VISIT (OUTPATIENT)
Dept: OBGYN CLINIC | Age: 46
End: 2021-07-28
Payer: COMMERCIAL

## 2021-07-28 VITALS — SYSTOLIC BLOOD PRESSURE: 128 MMHG | DIASTOLIC BLOOD PRESSURE: 78 MMHG | BODY MASS INDEX: 32.11 KG/M2 | WEIGHT: 205 LBS

## 2021-07-28 DIAGNOSIS — Z01.419 ENCOUNTER FOR GYNECOLOGICAL EXAMINATION WITHOUT ABNORMAL FINDING: Primary | ICD-10-CM

## 2021-07-28 DIAGNOSIS — Z01.419 ENCOUNTER FOR ROUTINE GYNECOLOGICAL EXAMINATION WITH PAPANICOLAOU SMEAR OF CERVIX: ICD-10-CM

## 2021-07-28 PROCEDURE — 99396 PREV VISIT EST AGE 40-64: CPT | Performed by: OBSTETRICS & GYNECOLOGY

## 2021-07-28 RX ORDER — LEVONORGESTREL AND ETHINYL ESTRADIOL 0.15-0.03
KIT ORAL
Qty: 28 TABLET | Refills: 11 | Status: SHIPPED | OUTPATIENT
Start: 2021-07-28 | End: 2022-04-19

## 2021-07-28 NOTE — PROGRESS NOTES
Annual exam    Elizabeth Polanco is a 55 y.o. presenting for annual exam.   Her main concerns today include refill on birth control    Ob/Gyn Hx:    No LMP recorded. (Menstrual status: Chemically Induced). Menses- regular monthly cycles? no, Bothersome? no  Contraception-pills  STI- declined  SA-yes    Health maintenance:  Pap-2020- normal and negative HPV  Mammo-2021- normal  Colonoscopy- N/A- info given      Past Medical History:   Diagnosis Date    Abnormal Pap smear     -recheck and was normal    Asthma     Asthma     Proventil inhaler PRN    Depression with anxiety 2018    Depression with anxiety     Endometriosis     fredy valerio md    Family history of ovarian cancer 2014    Fibroids     Gastritis 2018    EGD -  angel Ball md    GERD (gastroesophageal reflux disease)     reflux    H/O gastric bypass 2018    gastric bypass angel ball [facility] - [facility]    Heart abnormalities     heart mumor     Hx of gastric bypass 10/21/2015    laparoscopic sleeve gastrectomy - inital weight 266 - Angel Ball md    HX OTHER MEDICAL     migraines    Migraines 2011    Obesity (BMI 30-39. 9)     Scoliosis of thoracic spine 2010    mri    Thyroid nodule     migraines       Past Surgical History:   Procedure Laterality Date    HX BREAST BIOPSY Left     incomplete.  could not locate lump    HX CHOLECYSTECTOMY  2013    [facility]vin [facility] HX GASTRIC BYPASS      HX GI      HX MYOMECTOMY  5/17/10    HX OTHER SURGICAL      pilonidal cyst    HX OTHER SURGICAL      tonsillectomy    HX OTHER SURGICAL      cyst removed lip    HX OTHER SURGICAL      fibroids removed    [place] MYOMECTOMY 1-4,W/TOT 250GMS/<,ABD [facility]  5/17/10       Family History   Problem Relation Age of Onset    Heart Disease Mother     Cancer Mother         ovarian cancer    Breast Cancer Mother         unsure of breast ca diagnosis    Ovarian Cancer Mother Social History     Socioeconomic History    Marital status:      Spouse name: Not on file    Number of children: Not on file    Years of education: Not on file    Highest education level: Not on file   Occupational History    Not on file   Tobacco Use    Smoking status: Never Smoker    Smokeless tobacco: Never Used   Substance and Sexual Activity    Alcohol use: Yes     Comment: occasional    Drug use: No    Sexual activity: Yes     Partners: Male     Birth control/protection: Pill   Other Topics Concern    Not on file   Social History Narrative    Habits:  She is a never smoker. Never drinker, and does not abuse drugs. Social History:  The patient is  to her second  of 5 years duration and now    She has one child who is 6years old, a son. 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 She was going to [employer] but she is considering going to [facility]. Family History:  Father is alive and well. Mother  at age 64 of ventricular atrial fibrillation. Other medical problems included end stage renal disease on dialysis, diabetes, heart disease, obesity. She has two sisters, alive and well. Social Determinants of Health     Financial Resource Strain:     Difficulty of Paying Living Expenses:    Food Insecurity:     Worried About Running Out of Food in the Last Year:     [address] in the Last Year:    Transportation Needs:     Lack of Transportation (Medical):      Lack of Transportation (Non-Medical):    Physical Activity:     Days of Exercise per Week:     Minutes of Exercise per Session:    Stress:     Feeling of Stress :    Social Connections:     Frequency of Communication with Friends and Family:     Frequency of Social Gatherings with Friends and Family:     Attends Druze Services:     Active Member of Clubs or Organizations:     Attends Club or Organization Meetings:     Marital Status:    Intimate Partner Violence:     Fear of Current or Ex-Partner:     Emotionally Abused:     Physically Abused:     Sexually Abused:        Current Outpatient Medications   Medication Sig Dispense Refill    sertraline (ZOLOFT) 100 mg tablet TAKE 1 TABLET BY MOUTH EVERY DAY 30 Tablet 11    levonorgestrel-ethinyl estradiol (Altavera, 28,) 0.15-0.03 mg tab TAKE 1 TABLET BY MOUTH EVERY DAY, SKIP PLACEBO PILLS AS DIRECTED 28 Tablet 1    ferrous gluconate 324 mg (37.5 mg iron) tablet Take 1 Tablet by mouth every other day. 30 Tablet 11    busPIRone (BUSPAR) 7.5 mg tablet Take 1 Tablet by mouth three (3) times daily. 90 Tablet 5    SUMAtriptan (IMITREX) 50 mg tablet TK 1 T PO BID PRN (Patient not taking: Reported on 6/17/2021) 12 Tab 11    FAMOTIDINE (PEPCID PO) Take  by mouth. Allergies   Allergen Reactions    Pertussis Vaccines Other (comments)     Shortness of breathe    Mouth Cleanser [Carbamide Peroxide] Hives     glyoxide    Pineapple Hives    Rondec [Brompheniramine-Pseudoephedrin] Hives    Shellfish Containing Products Hives    Tetracycline Hives         Physical Exam    There were no vitals taken for this visit.     Constitutional  · Appearance: well-nourished, well developed, alert, in no acute distress    HENT  · Head and Face: appears normal    Neck  · Inspection/Palpation: normal appearance, no masses or tenderness  · Lymph Nodes: no lymphadenopathy present  · Thyroid: gland size normal, nontender, no nodules or masses present on palpation    Chest  · Respiratory Effort: non-labored breathing  · Auscultation: CTAB, normal breath sounds    Cardiovascular  · Heart:  · Auscultation: regular rate and rhythm without murmur  · Extremities: no peripheral edema    Breasts  · Inspection of Breasts: breasts symmetrical, no skin changes, no discharge present, nipple appearance normal, no skin retraction present  · Palpation of Breasts and Axillae: no masses present on palpation, no breast tenderness  · Axillary Lymph Nodes: no lymphadenopathy present    Gastrointestinal  · Abdominal Examination: abdomen non-tender to palpation, normal bowel sounds, no masses present  · Liver and spleen: no hepatomegaly present, spleen not palpable  · Hernias: no hernias identified    Genitourinary  · External Genitalia: normal appearance for age, no discharge present, no tenderness present, no inflammatory lesions present, no masses present, no atrophy present  · Vagina: normal vaginal vault without central or paravaginal defects, no discharge present, no inflammatory lesions present, no masses present  · Bladder: non-tender to palpation  · Urethra: appears normal  · Cervix: normal  · Perineum: perineum within normal limits, no evidence of trauma, no rashes or skin lesions present    Skin  · General Inspection: no rash, no lesions identified    Neurologic/Psychiatric  · Mental Status:  · Orientation: grossly oriented to person, place and time  · Mood and Affect: mood normal, affect appropriate      Assessment/Plan:  55 y.o. overall doing well.      Health Maintenance:  -counseled re: diet, exercise, healthy lifestyle  -pap/HPV- UTD  -STI testing DECLINED  -refer for mammo- UTD  -refer for colon- info given    RTC: 1 year for annual wellness assessment, or sooner prn for problems or concerns  -handouts and instructions provided    Nitza Beebe  7/28/2021  7:57 AM     Signed By: Aimee Gonsales MD     July 28, 2021

## 2021-07-28 NOTE — PATIENT INSTRUCTIONS
Pelvic Exam: Care Instructions  Overview     When your doctor examines your pelvic organs, it's called a pelvic exam. This exam is done to evaluate symptoms, such as pelvic pain or abnormal vaginal bleeding and discharge. It may also be done to collect samples of cells for cervical cancer screening. Before your exam, it's important to share some information with your doctor. You can talk about any concerns you may have. Your doctor will also want to know if you are pregnant or use birth control. And your doctor will want to hear about any problems, surgeries, or procedures you have had in your pelvic area. You will also need to tell your doctor when your last period was. Follow-up care is a key part of your treatment and safety. Be sure to make and go to all appointments, and call your doctor if you are having problems. It's also a good idea to know your test results and keep a list of the medicines you take. How is a pelvic exam done? · During a pelvic exam, you will:  ? Take off your clothes below the waist. You will get a paper or cloth cover to put over the lower half of your body. ? Lie on your back on an exam table with your feet and legs supported by footrests. · The doctor may:  ? Ask you to relax your knees. Your knees need to lean out, toward the walls. ? Check the opening of your vagina for sores or swelling. ? Gently put a tool called a speculum into your vagina. It opens the vagina a little bit. You may feel some pressure. The speculum lets your doctor see inside the vagina. ? Use a small brush, spatula, or swab to get a sample for testing. The doctor then removes the speculum. ? Put on gloves and put one or two fingers of one hand into your vagina. The other hand goes on your lower belly. This lets your doctor feel your pelvic organs. You will probably feel some pressure. ? Put one gloved finger into your rectum and one into your vagina, if needed.  This can also help check your pelvic organs. You may have a small amount of vaginal discharge or bleeding after the exam.  Why is a pelvic exam done? A pelvic exam may be done:  · To collect samples of cells for cervical cancer screening. · To check for vaginal infection. · To check for sexually transmitted infections, such as chlamydia or herpes. · To help find the cause of abnormal uterine bleeding. · To look for problems like uterine fibroids, ovarian cysts, or uterine prolapse. · To help find the cause of pelvic or belly pain. · Before inserting an intrauterine device (IUD). · To collect evidence if you've been sexually assaulted. What are the risks of a pelvic exam?  There is a small chance that the doctor will find something on a pelvic exam that would not have caused a problem. This is called overdiagnosis. It could lead to tests or treatment you don't need. When should you call for help? Watch closely for changes in your health, and be sure to contact your doctor if you have any problems. Where can you learn more? Go to http://www.gray.com/  Enter M421 in the search box to learn more about \"Pelvic Exam: Care Instructions. \"  Current as of: July 17, 2020               Content Version: 12.8  © 8131-0577 [employer]. Care instructions adapted under license by [employer] (which disclaims liability or warranty for this information). If you have questions about a medical condition or this instruction, always ask your healthcare professional. [place] [address] any warranty or liability for your use of this information.

## 2021-09-10 ENCOUNTER — HOSPITAL ENCOUNTER (EMERGENCY)
Age: 46
Discharge: HOME | End: 2021-09-10
Attending: EMERGENCY MEDICINE
Payer: COMMERCIAL

## 2021-09-10 VITALS
TEMPERATURE: 98.4 F | RESPIRATION RATE: 16 BRPM | HEART RATE: 62 BPM | DIASTOLIC BLOOD PRESSURE: 80 MMHG | OXYGEN SATURATION: 99 % | SYSTOLIC BLOOD PRESSURE: 128 MMHG | BODY MASS INDEX: 33.08 KG/M2 | HEIGHT: 67 IN | WEIGHT: 210.76 LBS

## 2021-09-10 DIAGNOSIS — R51.9 NONINTRACTABLE HEADACHE, UNSPECIFIED CHRONICITY PATTERN, UNSPECIFIED HEADACHE TYPE: Primary | ICD-10-CM

## 2021-09-10 PROCEDURE — 96365 THER/PROPH/DIAG IV INF INIT: CPT

## 2021-09-10 PROCEDURE — 74011250637 HC RX REV CODE- 250/637: Performed by: EMERGENCY MEDICINE

## 2021-09-10 PROCEDURE — 74011000258 HC RX REV CODE- 258: Performed by: EMERGENCY MEDICINE

## 2021-09-10 PROCEDURE — 99283 EMERGENCY DEPT VISIT LOW MDM: CPT

## 2021-09-10 PROCEDURE — 74011250636 HC RX REV CODE- 250/636: Performed by: EMERGENCY MEDICINE

## 2021-09-10 PROCEDURE — 96375 TX/PRO/DX INJ NEW DRUG ADDON: CPT

## 2021-09-10 RX ORDER — NAPROXEN SODIUM 220 MG
440 TABLET ORAL 2 TIMES DAILY WITH MEALS
COMMUNITY
End: 2021-09-15 | Stop reason: ALTCHOICE

## 2021-09-10 RX ORDER — BUTALBITAL, ACETAMINOPHEN AND CAFFEINE 50; 325; 40 MG/1; MG/1; MG/1
TABLET ORAL
COMMUNITY
End: 2021-09-15 | Stop reason: ALTCHOICE

## 2021-09-10 RX ORDER — DIPHENHYDRAMINE HYDROCHLORIDE 50 MG/ML
50 INJECTION, SOLUTION INTRAMUSCULAR; INTRAVENOUS
Status: COMPLETED | OUTPATIENT
Start: 2021-09-10 | End: 2021-09-10

## 2021-09-10 RX ORDER — DEXAMETHASONE SODIUM PHOSPHATE 10 MG/ML
10 INJECTION INTRAMUSCULAR; INTRAVENOUS ONCE
Status: COMPLETED | OUTPATIENT
Start: 2021-09-10 | End: 2021-09-10

## 2021-09-10 RX ORDER — SUMATRIPTAN 25 MG/1
25-100 TABLET, FILM COATED ORAL
Qty: 20 TABLET | Refills: 0 | Status: SHIPPED | OUTPATIENT
Start: 2021-09-10 | End: 2021-09-15 | Stop reason: DRUGHIGH

## 2021-09-10 RX ORDER — BUTALBITAL, ACETAMINOPHEN AND CAFFEINE 50; 325; 40 MG/1; MG/1; MG/1
2 TABLET ORAL
Status: COMPLETED | OUTPATIENT
Start: 2021-09-10 | End: 2021-09-10

## 2021-09-10 RX ORDER — KETOROLAC TROMETHAMINE 30 MG/ML
15 INJECTION, SOLUTION INTRAMUSCULAR; INTRAVENOUS
Status: COMPLETED | OUTPATIENT
Start: 2021-09-10 | End: 2021-09-10

## 2021-09-10 RX ORDER — SUMATRIPTAN 5 MG/1
SPRAY NASAL
COMMUNITY
End: 2021-09-10 | Stop reason: ALTCHOICE

## 2021-09-10 RX ORDER — MAGNESIUM SULFATE HEPTAHYDRATE 40 MG/ML
2 INJECTION, SOLUTION INTRAVENOUS
Status: COMPLETED | OUTPATIENT
Start: 2021-09-10 | End: 2021-09-10

## 2021-09-10 RX ORDER — ACETAMINOPHEN 500 MG
1000 TABLET ORAL
COMMUNITY
End: 2021-09-15 | Stop reason: ALTCHOICE

## 2021-09-10 RX ADMIN — MAGNESIUM SULFATE HEPTAHYDRATE 2 G: 40 INJECTION, SOLUTION INTRAVENOUS at 14:25

## 2021-09-10 RX ADMIN — SODIUM CHLORIDE 1000 ML: 9 INJECTION, SOLUTION INTRAVENOUS at 14:25

## 2021-09-10 RX ADMIN — SODIUM CHLORIDE 25 MG: 900 INJECTION, SOLUTION INTRAVENOUS at 14:25

## 2021-09-10 RX ADMIN — BUTALBITAL, ACETAMINOPHEN, AND CAFFEINE 2 TABLET: 50; 325; 40 TABLET ORAL at 14:26

## 2021-09-10 RX ADMIN — DEXAMETHASONE SODIUM PHOSPHATE 10 MG: 10 INJECTION, SOLUTION INTRAMUSCULAR; INTRAVENOUS at 14:26

## 2021-09-10 RX ADMIN — DIPHENHYDRAMINE HYDROCHLORIDE 50 MG: 50 INJECTION, SOLUTION INTRAMUSCULAR; INTRAVENOUS at 14:26

## 2021-09-10 RX ADMIN — KETOROLAC TROMETHAMINE 15 MG: 30 INJECTION, SOLUTION INTRAMUSCULAR; INTRAVENOUS at 14:25

## 2021-09-10 NOTE — ED PROVIDER NOTES
49-year-old female with a history of anxiety and depression, fibroids, and migraines is here with a migraine that has been present for about 2 days and was not relieved by Tylenol, Fioricet, or Imitrex. I feel similar to other migraines, but worse. She thinks she has had them worse since having Covid in December. She has increased pain with light in her eyes and with loud noises. She has some nausea. No fever. No weakness or numbness or tingling. Mild blurriness of the right eye similar to migraine she has had in the past. She is not interested in any diagnostic exam and would just like to feel better. She does not think this is anything any different than her typical migraine, just a worse version of it. She requested not to have Benadryl, Toradol, and Reglan all simultaneously as this makes her feel squirrelly. Past Medical History:   Diagnosis Date    Abnormal Pap smear     -recheck and was normal    Asthma     Asthma     Proventil inhaler PRN    Depression with anxiety 2018    Depression with anxiety     Endometriosis     fredy valerio md    Family history of ovarian cancer 2014    Fibroids     Gastritis 2018    EGD -  angel Ball md    GERD (gastroesophageal reflux disease)     reflux    H/O gastric bypass 2018    [facility]astric bypass [facility]    Heart abnormalities     heart mumor     Hx of gastric bypass 10/21/2015    laparoscopic sleeve gastrectomy - inital weight 266 - Angel Ball md    HX OTHER MEDICAL     migraines    Migraines 2011    Obesity (BMI 30-39. 9)     Scoliosis of thoracic spine 2010    mri    Thyroid nodule     migraines       Past Surgical History:   Procedure Laterality Date    HX BREAST BIOPSY Left     incomplete.  could not locate lump    HX  SECTION      HX [facility]LECYSTECTOMY  [facility]    [facility] HX GASTRIC BYPASS      HX GI      HX HEENT      HX MYOMECTOMY 5/17/10    HX OTHER SURGICAL      pilonidal cyst    HX OTHER SURGICAL      tonsillectomy    HX OTHER SURGICAL      cyst removed lip    HX OTHER SURGICAL      fibroids removed    [place] MYOMECTOMY 1-4,W/TOT 250GMS/<,ABD [facility]  5/17/10         Family History:   Problem Relation Age of Onset    Heart Disease Mother     Cancer Mother         ovarian cancer    Breast Cancer Mother         unsure of breast ca diagnosis    Ovarian Cancer Mother        Social History     Socioeconomic History    Marital status:      Spouse name: Not on file    Number of children: Not on file    Years of education: Not on file    Highest education level: Not on file   Occupational History    Not on file   Tobacco Use    Smoking status: Never Smoker    Smokeless tobacco: Never Used   Vaping Use    Vaping Use: Never used   Substance and Sexual Activity    Alcohol use: Yes     Alcohol/week: 1.0 standard drinks     Types: 1 Glasses of wine per week     Comment: occasional (once a week)    Drug use: No    Sexual activity: Yes     Partners: Male     Birth control/protection: Pill   Other Topics Concern    Not on file   Social History Narrative    Habits:  She is a never smoker. Never drinker, and does not abuse drugs. Social History:  The patient is  to her second  of 5 years duration and now    She has one child who is 6years old, a son. 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 She was going to [employer] but she is considering going to [facility]. Family History:  Father is alive and well. Mother  at age 64 of ventricular atrial fibrillation. Other medical problems included end stage renal disease on dialysis, diabetes, heart disease, obesity. She has two sisters, alive and well.      Social Determinants of Health     Financial Resource Strain:     Difficulty of Paying Living Expenses:    Food Insecurity:     Worried About [facility] in the Last Year:    [address] in the Last Year:    Transportation Needs:     Lack of Transportation (Medical):  Lack of Transportation (Non-Medical):    Physical Activity:     Days of Exercise per Week:     Minutes of Exercise per Session:    Stress:     Feeling of Stress :    Social Connections:     Frequency of Communication with Friends and Family:     Frequency of Social Gatherings with Friends and Family:     Attends Mandaen Services:     Active Member of Clubs or Organizations:     Attends Club or Organization Meetings:     Marital Status:    Intimate Partner Violence:     Fear of Current or Ex-Partner:     Emotionally Abused:     Physically Abused:     Sexually Abused: ALLERGIES: Pertussis vaccines, Mouth cleanser [carbamide peroxide], Pineapple, Rondec [brompheniramine-pseudoephedrin], Seafood, Shellfish containing products, and Tetracycline    Review of Systems   Constitutional: Negative for fever. HENT: Negative for trouble swallowing. Eyes: Positive for photophobia. Negative for visual disturbance. Respiratory: Negative for cough. Cardiovascular: Negative for chest pain. Gastrointestinal: Negative for abdominal pain. Genitourinary: Negative for difficulty urinating. Musculoskeletal: Negative for gait problem. Skin: Negative for rash. Neurological: Positive for headaches. Hematological: Does not bruise/bleed easily. Psychiatric/Behavioral: Negative for sleep disturbance. Vitals:    09/10/21 1154   BP: (!) 142/82   Pulse: 64   Resp: 16   Temp: 98.5 °F (36.9 °C)   SpO2: 99%   Weight: 95.6 kg (210 lb 12.2 oz)   Height: 5' 7\" (1.702 m)            Physical Exam  Constitutional:       Appearance: Normal appearance. HENT:      Head: Normocephalic. Nose: Nose normal.      Mouth/Throat:      Mouth: Mucous membranes are moist.   Eyes:      Extraocular Movements: Extraocular movements intact. Conjunctiva/sclera: Conjunctivae normal.   Cardiovascular:      Rate and Rhythm: Normal rate. Pulmonary:      Effort: Pulmonary effort is normal. No respiratory distress. Abdominal:      General: Abdomen is flat. Musculoskeletal:         General: Normal range of motion. Skin:     Findings: No rash. Neurological:      General: No focal deficit present. Mental Status: She is alert.       Comments: PERRL  EOM intact  Able to hear bilaterally  SILT to both sides of forehead, cheeks, and jaw  Uvula midline, soft palate raises  Vision grossly intact  No facial asymmetry   Able to shrug shoulders and turn head from side to side  Able to stick tongue out and move side to side  No pronator drift  SILT median, radial, ulnar nerves  5/5  strength bilaterally and flexion/extension at the elbows  5/5 flexion/extension both hips, knees, and dorsi/plantar flexion  SILT both lower extremities     Psychiatric:         Behavior: Behavior normal.          MDM  Number of Diagnoses or Management Options  Nonintractable headache, unspecified chronicity pattern, unspecified headache type  Diagnosis management comments: Normal neuro exam in a patient with a history of migraines who believes this is just a migraine and does not want any diagnostics  Meds given and patient felt much better and was ready to go home  Follow-up with neurology  Return to ED if worsening symptoms         Procedures

## 2021-09-10 NOTE — ED NOTES
Dr. Nusrat Rain reviewed discharge instructions with the patient. The patient verbalized understanding. Patient ambulated out of the emergency department without assistance. Patient is in no apparent distress.

## 2021-09-15 ENCOUNTER — OFFICE VISIT (OUTPATIENT)
Dept: NEUROLOGY | Age: 46
End: 2021-09-15
Payer: COMMERCIAL

## 2021-09-15 VITALS
SYSTOLIC BLOOD PRESSURE: 130 MMHG | HEART RATE: 80 BPM | WEIGHT: 206 LBS | OXYGEN SATURATION: 98 % | BODY MASS INDEX: 32.33 KG/M2 | DIASTOLIC BLOOD PRESSURE: 70 MMHG | HEIGHT: 67 IN

## 2021-09-15 DIAGNOSIS — G43.109 MIGRAINE WITH AURA AND WITHOUT STATUS MIGRAINOSUS, NOT INTRACTABLE: Primary | ICD-10-CM

## 2021-09-15 PROCEDURE — 99204 OFFICE O/P NEW MOD 45 MIN: CPT | Performed by: PSYCHIATRY & NEUROLOGY

## 2021-09-15 RX ORDER — SUMATRIPTAN 100 MG/1
100 TABLET, FILM COATED ORAL
Qty: 9 TABLET | Refills: 5 | Status: SHIPPED | OUTPATIENT
Start: 2021-09-15 | End: 2022-03-16

## 2021-09-15 RX ORDER — VERAPAMIL HYDROCHLORIDE 120 MG/1
120 CAPSULE, EXTENDED RELEASE ORAL DAILY
Qty: 90 CAPSULE | Refills: 1 | Status: SHIPPED | OUTPATIENT
Start: 2021-09-15 | End: 2022-02-09

## 2021-09-15 NOTE — PROGRESS NOTES
Neurology Consult Note      HISTORY PROVIDED BY: patient    Chief Complaint:   Chief Complaint   Patient presents with    New Patient    Migraine      Subjective:    Katherin Borja is a 55 y.o. right handed female who presents in consultation for headache. Pt reports onset of HAs in her 19's. Typically pain is right sided periorbital pressure that then spreads, throbbing and aching pain, +N/V/P/P, blurry vision, two years ago had right facial numbness that increased with pain, has seen stars then splotches in vision that obscured vision. +Family h/o MHAs in Mom and sister. Drinks over 64oz of water a day, 1 cup of caffeine in AM. Sleeps well, +Snores, no apnea on sleep study. She took Topamax 8 years ago until pregnant with son, she believes it helped. She has used OTC abortive meds and Fioricet. Was seen in the ED on Friday, given Imitrex 25mg tabs, tried this in past. Sister gave her an Imitrex NS on Friday which did not help. She had COVID-19 in Dec, prior to that 0-2 per week, now 3-4 per week, taking something for pain 4 days a week. Past Medical History:   Diagnosis Date    Abnormal Pap smear     -recheck and was normal    Asthma     Proventil inhaler PRN    Depression with anxiety 2018    Endometriosis     fredy valerio md    Family history of ovarian cancer 2014    Fibroids     Gastritis 2018    EGD -  angel Ball md    GERD (gastroesophageal reflux disease)     reflux    H/O gastric bypass 2018    [facility]    Heart murmur     heart mumor     Hx of gastric bypass 10/21/2015    laparoscopic sleeve gastrectomy - inital weight 266 - Angel Ball md    Iron deficiency     Migraines 2011    Obesity (BMI 30-39. 9)     Scoliosis of thoracic spine 2010    mri    Thyroid nodule       Past Surgical History:   Procedure Laterality Date    HX BREAST BIOPSY Left     incomplete.  could not locate lump    HX  SECTION      HX [facility]LECYSTECTOMY  [facility]    [facility] HX GASTRIC BYPASS      HX GI      HX HEENT      HX MYOMECTOMY  5/17/10    HX OTHER SURGICAL      pilonidal cyst    HX OTHER SURGICAL      tonsillectomy    HX OTHER SURGICAL      cyst removed lip    HX OTHER SURGICAL      fibroids removed    [place] MYOMECTOMY 1-4,W/TOT 250GMS/<,ABD [facility]  5/17/10      Social History     Socioeconomic History    Marital status:      Spouse name: Not on file    Number of children: Not on file    Years of education: Not on file    Highest education level: Not on file   Occupational History    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 Never drinker, and does not abuse drugs. Social History:  The patient is  to her second  of 5 years duration and now    She has one child who is 6years old, a son. 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 She was going to [employer] but she is considering going to [employer]. Family History:  Father is alive and well. Mother  at age 64 of ventricular atrial fibrillation. Other medical problems included end stage renal disease on dialysis, diabetes, heart disease, obesity. She has two sisters, alive and well.      Social Determinants of Health     Financial Resource Strain:     Difficulty of Paying Living Expenses:    Food Insecurity:     Worried About Running Out of Food in the Last Year:     Ran Out of Food in the Last Year:    Transportation Needs:     Lack of Transportation (Medical):  Lack of Transportation (Non-Medical):    Physical Activity:     Days of Exercise per Week:     Minutes of Exercise per Session:    Stress:     Feeling of Stress :    Social Connections:     Frequency of Communication with Friends and Family:     Frequency of Social Gatherings with Friends and Family:     Attends [employer] Services:     Active Member of Clubs or Organizations:     Attends Club or Organization Meetings:     Marital Status:    Intimate Partner Violence:     Fear of Current or Ex-Partner:     Emotionally Abused:     Physically Abused:     Sexually Abused:      Family History   Problem Relation Age of Onset    Heart Disease Mother     Breast Cancer Mother         unsure of breast ca diagnosis    Ovarian Cancer Mother     Colon Cancer Father     Kidney Disease Father     Migraines Sister     Other Sister         Elhers Danlos    Seizures Sister         Since Childhood    GERD Son          Objective:   Review of Systems   Respiratory:        Snoring     Neurological: Positive for headaches. Psychiatric/Behavioral: Positive for depression. The patient is nervous/anxious. All other systems reviewed and are negative. Allergies   Allergen Reactions    Pertussis Vaccines Other (comments)     Shortness of breathe    Mouth Cleanser [Carbamide Peroxide] Hives     glyoxide    Pineapple Hives    Rondec [Brompheniramine-Pseudoephedrin] Hives    Seafood Other (comments)     Patient states her left knee swells    Shellfish Containing Products Hives    Tetracycline Hives        Meds:  Outpatient Medications Prior to Visit   Medication Sig Dispense Refill    SUMAtriptan (Imitrex) 25 mg tablet Take 1-4 Tablets by mouth two (2) times daily as needed for Migraine.  Max 200mg in 24 hours, may repeat every 2 hours once 20 Tablet 0    levonorgestrel-ethinyl estradiol (Altavera, 28,) 0.15-0.03 mg tab TAKE 1 TABLET BY MOUTH EVERY DAY, SKIP PLACEBO PILLS AS DIRECTED 28 Tablet 11    sertraline (ZOLOFT) 100 mg tablet TAKE 1 TABLET BY MOUTH EVERY DAY 30 Tablet 11    ferrous gluconate 324 mg (37.5 mg iron) tablet Take 1 Tablet by mouth every other day. 30 Tablet 11    busPIRone (BUSPAR) 7.5 mg tablet Take 1 Tablet by mouth three (3) times daily. 90 Tablet 5    butalbital-acetaminophen-caffeine (FIORICET, ESGIC) -40 mg per tablet butalbital-acetaminophen-caffeine 50 mg-325 mg-40 mg tablet (Patient not taking: Reported on 9/15/2021)      naproxen sodium (Aleve) 220 mg tablet Take 440 mg by mouth two (2) times daily (with meals). (Patient not taking: Reported on 9/15/2021)      acetaminophen (Tylenol Extra Strength) 500 mg tablet Take 1,000 mg by mouth every six (6) hours as needed for Pain. (Patient not taking: Reported on 9/15/2021)      aspirin/salicylamide/caffeine (BC HEADACHE POWDER PO) Take  by mouth. (Patient not taking: Reported on 9/15/2021)      FAMOTIDINE (PEPCID PO) Take  by mouth. (Patient not taking: Reported on 9/15/2021)       No facility-administered medications prior to visit. Imaging:  MRI Results (most recent):  Results from Hospital Encounter encounter on 03/01/10    MRI SPINE THORACIC W AND W/O CONT    Narrative  *Final Report*    ICD Codes / Adm. Diagnosis:    /   RADICULOPATHY  Examination:  T SPINE MRI W AND WO CON  - [id number] - Mar  1 2010  7:50PM  Accession No:  [id number]  Reason:  RADICULOPATHY      REPORT:  Thoracic spine MRI examination consists of sagittal STIR and T2 and T1 and  postgadolinium T1 sagittal images. Also axial T2 and T1 and postgadolinium  axial T1-weighted images. 20 mL IV Magnevist.    The cord is of normal size and signal intensity. STIR images demonstrate no  pathologic high signal intensity foci in thoracic spine bone marrow. No  intra or extradural mass is demonstrated. There is some dextro-convex  scoliosis.  No intra or extradural mass is demonstrated. No pathologic  enhancement is demonstrated following contrast administration. IMPRESSION:  1. Scoliosis    Interpreting/Reading Doctor: Josep De Santiago ([id number])  Transcribed: n/a on 03/02/2010  Approved: Josep De Santiago ([id number])  03/02/2010    Distribution:  Attending Doctor: Harpreet Rivas  Alternate Doctor: Harpreet Rivas   CT Results (most recent):  No results found for this or any previous visit. Reviewed records in [facility] and [facility] tab today    Lab Review   Results for orders placed or performed in visit on 06/17/21   HEMOGLOBIN A1C WITH EAG   Result Value Ref Range    Hemoglobin A1c 5.4 4.8 - 5.6 %    Estimated average glucose 108 mg/dL   TSH 3RD GENERATION   Result Value Ref Range    TSH 1.520 0.450 - 4.500 uIU/mL   LIPID PANEL   Result Value Ref Range    Cholesterol, total 158 100 - 199 mg/dL    Triglyceride 97 0 - 149 mg/dL    HDL Cholesterol 62 >39 mg/dL    VLDL, calculated 18 5 - 40 mg/dL    LDL, calculated 78 0 - 99 mg/dL   METABOLIC PANEL, COMPREHENSIVE   Result Value Ref Range    Glucose 82 65 - 99 mg/dL    BUN 6 6 - 24 mg/dL    Creatinine 0.54 (L) 0.57 - 1.00 mg/dL    GFR est non- >59 mL/min/1.73    GFR est  >59 mL/min/1.73    BUN/Creatinine ratio 11 9 - 23    Sodium 141 134 - 144 mmol/L    Potassium 4.4 3.5 - 5.2 mmol/L    Chloride 107 (H) 96 - 106 mmol/L    CO2 20 20 - 29 mmol/L    Calcium 9.0 8.7 - 10.2 mg/dL    Protein, total 7.1 6.0 - 8.5 g/dL    Albumin 4.1 3.8 - 4.8 g/dL    GLOBULIN, TOTAL 3.0 1.5 - 4.5 g/dL    A-G Ratio 1.4 1.2 - 2.2    Bilirubin, total 0.2 0.0 - 1.2 mg/dL    Alk.  phosphatase 90 48 - 121 IU/L    AST (SGOT) 17 0 - 40 IU/L    ALT (SGPT) 8 0 - 32 IU/L   CBC WITH AUTOMATED DIFF   Result Value Ref Range    WBC 4.1 3.4 - 10.8 x10E3/uL    RBC 3.79 3.77 - 5.28 x10E6/uL    HGB 9.8 (L) 11.1 - 15.9 g/dL    HCT 31.5 (L) 34.0 - 46.6 %    MCV 83 79 - 97 fL    MCH 25.9 (L) 26.6 - 33.0 pg    MCHC 31.1 (L) 31.5 - 35.7 g/dL RDW 15.8 (H) 11.7 - 15.4 %    PLATELET 032 731 - 797 x10E3/uL    NEUTROPHILS 57 Not Estab. %    Lymphocytes 31 Not Estab. %    MONOCYTES 10 Not Estab. %    EOSINOPHILS 1 Not Estab. %    BASOPHILS 1 Not Estab. %    ABS. NEUTROPHILS 2.3 1.4 - 7.0 x10E3/uL    Abs Lymphocytes 1.2 0.7 - 3.1 x10E3/uL    ABS. MONOCYTES 0.4 0.1 - 0.9 x10E3/uL    ABS. EOSINOPHILS 0.0 0.0 - 0.4 x10E3/uL    ABS. BASOPHILS 0.0 0.0 - 0.2 x10E3/uL    IMMATURE GRANULOCYTES 0 Not Estab. %    ABS. IMM. GRANS. 0.0 0.0 - 0.1 x10E3/uL        Exam:  Visit Vitals  /70   Pulse 80   Ht 5' 7\" (1.702 m)   Wt 206 lb (93.4 kg)   SpO2 98%   BMI 32.26 kg/m²     General:  Alert, cooperative, no distress. Head:  Normocephalic, without obvious abnormality, atraumatic. Respiratory:  Heart:   Non labored breathing  Regular rate and rhythm, no murmurs   Neck:   2+ carotids, no bruits   Extremities: Warm, no cyanosis or edema. Pulses: 2+ radial pulses. Neurologic:  MS: Alert and oriented x 4, speech intact. Language intact. Attention and fund of knowledge appropriate. Recent and remote memory intact.   Cranial Nerves:  II: visual fields Full to confrontation   II: pupils Equal, round, reactive to light   II: optic disc    III,VII: ptosis none   III,IV,VI: extraocular muscles  EOMI, no nystagmus or diplopia   V: facial light touch sensation  normal   VII: facial muscle function   symmetric   VIII: hearing intact   IX: soft palate elevation     XI: trapezius strength  5/5   XI: sternocleidomastoid strength 5/5   XII: tongue       Motor: normal bulk and tone, no tremor              Strength: 5/5 throughout, no PD  Sensory: intact to LT, PP  Coordination: FTN and HTS intact, ALE intact  Gait: normal gait, able to tandem walk  Reflexes: 2+ symmetric, toes downgoing     Assessment/Plan   Pt is a 55 y.o. right handed female with onset of HAs in her 19's, typically pain is right sided periorbital pressure that then spreads, throbbing and aching pain, +N/V/P/P, blurry vision, two years ago had right facial numbness that increased with pain, has seen stars then splotches in vision that obscured vision. Were 0-2 times a week until COVID-19 infection in Dec, 2020, now 3-4 per week. +Family h/o MHAs in Mom and sister. Exam with BMI 32.3 o/w non-focal and unremarkable. HA history is c/w migraine headache. Start Verapamil CR 120mg daily, side effects reviewed, for MHA prevention. Start Imitrex 100mg PO at onset of HA or aura. Pt is asked to keep a HA log and call in 8 weeks if no improvement in HAs. Encouraged to continue excellent water intake and limited caffeine intake. F/u in clinic at next available appt in 6 months, instructed to call in the interim if needed. ICD-10-CM ICD-9-CM    1. Migraine with aura and without status migrainosus, not intractable  G43.109 346.00        Signed:   Toby Braun MD  9/15/2021

## 2021-09-15 NOTE — LETTER
9/22/2021    Patient: Faustina Beard   YOB: 1975   Date of Visit: 9/15/2021     Nuvia Rowe MD  [id number] [address] [id number]  [address] [facility] [address]    Dear Nuvia Rowe MD,      Thank you for referring Ms. Raul Wynn to [address] [place] [address] for evaluation. My notes for this consultation are attached. If you have questions, please do not hesitate to call me. I look forward to following your patient along with you.       Sincerely,    Matthew Galvan MD

## 2021-09-15 NOTE — PATIENT INSTRUCTIONS
-Please call with any questions  -Please keep a headache log  -Call with an update in 8 weeks if no improvement in headaches  -Take Imitrex 100mg at onset of migraine to be most effective

## 2021-12-20 RX ORDER — BUSPIRONE HYDROCHLORIDE 7.5 MG/1
TABLET ORAL
Qty: 90 TABLET | Refills: 5 | Status: SHIPPED | OUTPATIENT
Start: 2021-12-20 | End: 2022-06-29

## 2022-02-09 RX ORDER — VERAPAMIL HYDROCHLORIDE 120 MG/1
CAPSULE, EXTENDED RELEASE ORAL
Qty: 90 CAPSULE | Refills: 0 | Status: SHIPPED | OUTPATIENT
Start: 2022-02-09 | End: 2022-05-18

## 2022-03-16 RX ORDER — SUMATRIPTAN 100 MG/1
100 TABLET, FILM COATED ORAL
Qty: 9 TABLET | Refills: 4 | Status: SHIPPED | OUTPATIENT
Start: 2022-03-16 | End: 2022-07-07 | Stop reason: SINTOL

## 2022-03-18 PROBLEM — Z98.84 H/O GASTRIC BYPASS: Status: ACTIVE | Noted: 2018-05-16

## 2022-03-19 PROBLEM — F41.8 DEPRESSION WITH ANXIETY: Status: ACTIVE | Noted: 2018-05-29

## 2022-03-19 PROBLEM — K29.70 GASTRITIS: Status: ACTIVE | Noted: 2018-05-16

## 2022-04-19 ENCOUNTER — TELEPHONE (OUTPATIENT)
Dept: OBGYN CLINIC | Age: 47
End: 2022-04-19

## 2022-04-19 DIAGNOSIS — Z01.419 ENCOUNTER FOR ROUTINE GYNECOLOGICAL EXAMINATION WITH PAPANICOLAOU SMEAR OF CERVIX: ICD-10-CM

## 2022-04-19 RX ORDER — LEVONORGESTREL AND ETHINYL ESTRADIOL 0.15-0.03
KIT ORAL
Qty: 28 TABLET | Refills: 2 | Status: SHIPPED | OUTPATIENT
Start: 2022-04-19 | End: 2022-06-16 | Stop reason: SDUPTHER

## 2022-04-19 NOTE — TELEPHONE ENCOUNTER
Message left at 630am    55year old patient last seen in the office on 7/28/2021 patient has next ae on 6/16/2022     Patient left a message asking for a refill of her ocp to get her to her scheduled appointment      This nurse called the patient back to confirm information    Prescription refill sent as per Md order to get patient to her scheduled appointment    Patient advised of need to keep appointment in order to get further refills. Patient advised of need to keep appointment in order to get further refills.

## 2022-04-25 NOTE — TELEPHONE ENCOUNTER
Pharmacy called and patient can  the prescription       Patient was advised and verbalized understanding.

## 2022-04-25 NOTE — TELEPHONE ENCOUNTER
Patient calling back at 1:45pm    Patient calling back to say that her prescription is not at the pharmacy    This nurse attempted to reach the patient and the pharmacy is closed for lunch. Patient advised this nurse will call her back    Patient verbalized understanding.

## 2022-04-29 ENCOUNTER — TELEPHONE (OUTPATIENT)
Dept: NEUROLOGY | Age: 47
End: 2022-04-29

## 2022-04-29 RX ORDER — METHYLPREDNISOLONE 4 MG/1
TABLET ORAL
Qty: 1 DOSE PACK | Refills: 0 | Status: SHIPPED | OUTPATIENT
Start: 2022-04-29 | End: 2022-06-16 | Stop reason: ALTCHOICE

## 2022-04-29 NOTE — TELEPHONE ENCOUNTER
Spoke with pt and informed per Dr. Migdalia Doshi to stop all pain meds, start a medrol dose pack. Make certain still taking verapamil for prevention and drinking 64oz of water a day. Pt states understanding.

## 2022-04-29 NOTE — TELEPHONE ENCOUNTER
Patient was calling because she has had a migraine for 6 days. Can you prescribe some medication, her appointment is scheduled for July. And she can't wait til then.   Please call her at [phone]

## 2022-04-29 NOTE — TELEPHONE ENCOUNTER
Stella Mccodr - Please call pt: Stop all pain meds, start a medrol dose pack. Make certain still taking verapamil for prevention and drinking 64oz of water a day.

## 2022-05-10 ENCOUNTER — APPOINTMENT (OUTPATIENT)
Dept: CT IMAGING | Age: 47
End: 2022-05-10
Attending: EMERGENCY MEDICINE
Payer: COMMERCIAL

## 2022-05-10 ENCOUNTER — HOSPITAL ENCOUNTER (EMERGENCY)
Age: 47
Discharge: HOME | End: 2022-05-10
Attending: EMERGENCY MEDICINE
Payer: COMMERCIAL

## 2022-05-10 VITALS
TEMPERATURE: 98 F | BODY MASS INDEX: 32.91 KG/M2 | OXYGEN SATURATION: 100 % | SYSTOLIC BLOOD PRESSURE: 144 MMHG | DIASTOLIC BLOOD PRESSURE: 81 MMHG | RESPIRATION RATE: 18 BRPM | HEART RATE: 81 BPM | WEIGHT: 209.66 LBS | HEIGHT: 67 IN

## 2022-05-10 DIAGNOSIS — G43.811 OTHER MIGRAINE WITH STATUS MIGRAINOSUS, INTRACTABLE: Primary | ICD-10-CM

## 2022-05-10 LAB
ALBUMIN SERPL-MCNC: 3.6 G/DL (ref 3.5–5)
ALBUMIN/GLOB SERPL: 0.9 {RATIO} (ref 1.1–2.2)
ALP SERPL-CCNC: 77 U/L (ref 45–117)
ALT SERPL-CCNC: 28 U/L (ref 12–78)
ANION GAP SERPL CALC-SCNC: 9 MMOL/L (ref 5–15)
AST SERPL-CCNC: 22 U/L (ref 15–37)
BASOPHILS # BLD: 0.1 K/UL (ref 0–0.1)
BASOPHILS NFR BLD: 1 % (ref 0–1)
BILIRUB SERPL-MCNC: 0.2 MG/DL (ref 0.2–1)
BUN SERPL-MCNC: 10 MG/DL (ref 6–20)
BUN/CREAT SERPL: 14 (ref 12–20)
CALCIUM SERPL-MCNC: 8.5 MG/DL (ref 8.5–10.1)
CHLORIDE SERPL-SCNC: 104 MMOL/L (ref 97–108)
CO2 SERPL-SCNC: 25 MMOL/L (ref 21–32)
CREAT SERPL-MCNC: 0.71 MG/DL (ref 0.55–1.02)
DIFFERENTIAL METHOD BLD: ABNORMAL
EOSINOPHIL # BLD: 0.1 K/UL (ref 0–0.4)
EOSINOPHIL NFR BLD: 3 % (ref 0–7)
ERYTHROCYTE [DISTWIDTH] IN BLOOD BY AUTOMATED COUNT: 15.8 % (ref 11.5–14.5)
GLOBULIN SER CALC-MCNC: 3.9 G/DL (ref 2–4)
GLUCOSE SERPL-MCNC: 79 MG/DL (ref 65–100)
HCG UR QL: NEGATIVE
HCT VFR BLD AUTO: 33.1 % (ref 35–47)
HGB BLD-MCNC: 10.1 G/DL (ref 11.5–16)
IMM GRANULOCYTES # BLD AUTO: 0 K/UL (ref 0–0.04)
IMM GRANULOCYTES NFR BLD AUTO: 0 % (ref 0–0.5)
LYMPHOCYTES # BLD: 1.5 K/UL (ref 0.8–3.5)
LYMPHOCYTES NFR BLD: 37 % (ref 12–49)
MCH RBC QN AUTO: 25.1 PG (ref 26–34)
MCHC RBC AUTO-ENTMCNC: 30.5 G/DL (ref 30–36.5)
MCV RBC AUTO: 82.3 FL (ref 80–99)
MONOCYTES # BLD: 0.5 K/UL (ref 0–1)
MONOCYTES NFR BLD: 13 % (ref 5–13)
NEUTS SEG # BLD: 1.8 K/UL (ref 1.8–8)
NEUTS SEG NFR BLD: 46 % (ref 32–75)
NRBC # BLD: 0 K/UL (ref 0–0.01)
NRBC BLD-RTO: 0 PER 100 WBC
PLATELET # BLD AUTO: 296 K/UL (ref 150–400)
PMV BLD AUTO: 10.4 FL (ref 8.9–12.9)
POTASSIUM SERPL-SCNC: 4.7 MMOL/L (ref 3.5–5.1)
PROT SERPL-MCNC: 7.5 G/DL (ref 6.4–8.2)
RBC # BLD AUTO: 4.02 M/UL (ref 3.8–5.2)
SODIUM SERPL-SCNC: 138 MMOL/L (ref 136–145)
WBC # BLD AUTO: 3.9 K/UL (ref 3.6–11)

## 2022-05-10 PROCEDURE — 70450 CT HEAD/BRAIN W/O DYE: CPT

## 2022-05-10 PROCEDURE — 81025 URINE PREGNANCY TEST: CPT

## 2022-05-10 PROCEDURE — 96365 THER/PROPH/DIAG IV INF INIT: CPT

## 2022-05-10 PROCEDURE — 85025 COMPLETE CBC W/AUTO DIFF WBC: CPT

## 2022-05-10 PROCEDURE — 96361 HYDRATE IV INFUSION ADD-ON: CPT

## 2022-05-10 PROCEDURE — 74011250637 HC RX REV CODE- 250/637: Performed by: EMERGENCY MEDICINE

## 2022-05-10 PROCEDURE — 74011250636 HC RX REV CODE- 250/636: Performed by: EMERGENCY MEDICINE

## 2022-05-10 PROCEDURE — 99285 EMERGENCY DEPT VISIT HI MDM: CPT

## 2022-05-10 PROCEDURE — 70496 CT ANGIOGRAPHY HEAD: CPT

## 2022-05-10 PROCEDURE — 80053 COMPREHEN METABOLIC PANEL: CPT

## 2022-05-10 PROCEDURE — 74011000636 HC RX REV CODE- 636: Performed by: EMERGENCY MEDICINE

## 2022-05-10 PROCEDURE — 96375 TX/PRO/DX INJ NEW DRUG ADDON: CPT

## 2022-05-10 PROCEDURE — 36415 COLL VENOUS BLD VENIPUNCTURE: CPT

## 2022-05-10 RX ORDER — DIPHENHYDRAMINE HCL 25 MG
50 CAPSULE ORAL
Status: COMPLETED | OUTPATIENT
Start: 2022-05-10 | End: 2022-05-10

## 2022-05-10 RX ORDER — MAGNESIUM SULFATE HEPTAHYDRATE 40 MG/ML
2 INJECTION, SOLUTION INTRAVENOUS
Status: COMPLETED | OUTPATIENT
Start: 2022-05-10 | End: 2022-05-10

## 2022-05-10 RX ORDER — PROCHLORPERAZINE EDISYLATE 5 MG/ML
10 INJECTION INTRAMUSCULAR; INTRAVENOUS
Status: COMPLETED | OUTPATIENT
Start: 2022-05-10 | End: 2022-05-10

## 2022-05-10 RX ORDER — MAGNESIUM SULFATE HEPTAHYDRATE 40 MG/ML
2 INJECTION, SOLUTION INTRAVENOUS
Status: DISCONTINUED | OUTPATIENT
Start: 2022-05-10 | End: 2022-05-10

## 2022-05-10 RX ORDER — ACETAMINOPHEN 500 MG
1000 TABLET ORAL
Status: COMPLETED | OUTPATIENT
Start: 2022-05-10 | End: 2022-05-10

## 2022-05-10 RX ADMIN — MAGNESIUM SULFATE HEPTAHYDRATE 2 G: 40 INJECTION, SOLUTION INTRAVENOUS at 10:19

## 2022-05-10 RX ADMIN — PROCHLORPERAZINE EDISYLATE 10 MG: 5 INJECTION, SOLUTION INTRAMUSCULAR; INTRAVENOUS at 10:01

## 2022-05-10 RX ADMIN — SODIUM CHLORIDE 1000 ML: 9 INJECTION, SOLUTION INTRAVENOUS at 09:59

## 2022-05-10 RX ADMIN — IOPAMIDOL 100 ML: 755 INJECTION, SOLUTION INTRAVENOUS at 10:49

## 2022-05-10 RX ADMIN — ACETAMINOPHEN 1000 MG: 500 TABLET ORAL at 10:00

## 2022-05-10 RX ADMIN — DIPHENHYDRAMINE HYDROCHLORIDE 50 MG: 25 CAPSULE ORAL at 10:00

## 2022-05-10 NOTE — ED TRIAGE NOTES
Pt arrives with migraine since yesterday. Hx of the same, last one was 2 weeks ago. Pt is followed by Neurologist, Dr. Huey Rincon for her frequent migraines.

## 2022-05-10 NOTE — ED PROVIDER NOTES
71-year-old female with a history of migraines presents with a chief complaint of headache. Patient recently had COVID and has suffered from migraines more frequently since that time. Her neurologist recently prescribed her a Medrol Dosepak. She completed that on . The current headache started abruptly on . She has taken multiple prescription medications for migraines at home with no relief. She denies any focal deficits. The headache is more intense than her normal migraine. She does not rate her pain on a scale. The pain does start in the front behind the eye and wraps around to the back right side of the head. Past Medical History:   Diagnosis Date    Abnormal Pap smear     -recheck and was normal    Asthma     Proventil inhaler PRN    Depression with anxiety 2018    Endometriosis     fredy valerio md    Family history of ovarian cancer 2014    Fibroids     Gastritis 2018    EGD -  angel Ball md    GERD (gastroesophageal reflux disease)     reflux    H/O gastric bypass 2018    gastric bypass angel ball [facility] - [facility]    Heart murmur     heart mumor     Hx of gastric bypass 10/21/2015    laparoscopic sleeve gastrectomy - inital weight 266 - Angel Ball md    Iron deficiency     Migraines 2011    Obesity (BMI 30-39. 9)     Scoliosis of thoracic spine 2010    mri    Thyroid nodule        Past Surgical History:   Procedure Laterality Date    HX BREAST BIOPSY Left     incomplete.  could not locate lump    HX  SECTION      HX [facility]LECYSTECTOMY  [facility]    [facility]vin [facility] HX GASTRIC BYPASS      HX GI      HX HEENT      HX MYOMECTOMY  5/17/10    HX OTHER SURGICAL      pilonidal cyst    HX OTHER SURGICAL      tonsillectomy    HX OTHER SURGICAL      cyst removed lip    HX OTHER SURGICAL      fibroids removed    IN MYOMECTOMY 1-4,W/TOT 250GMS/<,ABD [facility]  5/17/10         Family History: Problem Relation Age of Onset    Heart Disease Mother     Breast Cancer Mother         unsure of breast ca diagnosis    Ovarian Cancer Mother     Colon Cancer Father     Kidney Disease Father     Migraines Sister     Other Sister         Tobinhers Pinedalos    Seizures Sister         Since Childhood    GERD Son        Social History     Socioeconomic History    Marital status:      Spouse name: Not on file    Number of children: Not on file    Years of education: Not on file    Highest education level: Not on file   Occupational History    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 Never drinker, and does not abuse drugs. Social History:  The patient is  to her second  of 5 years duration and now    She has one child who is 6years old, a son. 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 She was going to [employer] but she is considering going to [employer]. Family History:  Father is alive and well. Mother  at age 64 of ventricular atrial fibrillation. Other medical problems included end stage renal disease on dialysis, diabetes, heart disease, obesity. She has two sisters, alive and well.      Social Determinants of Health     Financial Resource Strain:     Difficulty of Paying Living Expenses: Not on file   Food Insecurity:     Worried About Running Out of Food in the Last Year: Not on file    Dilip of Food in the Last Year: Not on file   Transportation Needs:     Lack of Transportation (Medical): Not on file    Lack of Transportation (Non-Medical): Not on file   Physical Activity:     Days of Exercise per Week: Not on file    Minutes of Exercise per Session: Not on file   Stress:     Feeling of Stress : Not on file   Social Connections:     Frequency of Communication with Friends and Family: Not on file    Frequency of Social Gatherings with Friends and Family: Not on file    Attends Buddhist Services: Not on file    Active Member of [address] [place] [address] [employer] or Organizations: Not on file    Attends Club or Organization Meetings: Not on file    Marital Status: Not on file   Intimate Partner Violence:     Fear of Current or Ex-Partner: Not on file    Emotionally Abused: Not on file    Physically Abused: Not on file    Sexually Abused: Not on file   Housing Stability:     Unable to Pay for Housing in the Last Year: Not on file    Number of [place] in the Last Year: Not on file    Unstable Housing in the Last Year: Not on file         ALLERGIES: Pertussis vaccines, Mouth cleanser [carbamide peroxide], Pineapple, Rondec [brompheniramine-pseudoephedrin], Seafood, Shellfish containing products, and Tetracycline    Review of Systems   Constitutional: Negative for fever. HENT: Negative for rhinorrhea. Respiratory: Negative for shortness of breath. Cardiovascular: Negative for chest pain. Gastrointestinal: Negative for abdominal pain. Genitourinary: Negative for dysuria. Musculoskeletal: Negative for back pain. Skin: Negative for wound. Neurological: Positive for headaches. Psychiatric/Behavioral: Negative for confusion. Vitals:    05/10/22 0858   BP: 125/76   Pulse: 64   Resp: 16   Temp: 98 °F (36.7 °C)   SpO2: 100%   Weight: 95.1 kg (209 lb 10.5 oz)   Height: 5' 7\" (1.702 m)            Physical Exam  Vitals and nursing note reviewed. Constitutional:       General: She is not in acute distress.      Appearance: Normal appearance. She is not ill-appearing, toxic-appearing or diaphoretic. HENT:      Head: Normocephalic and atraumatic. Eyes:      Extraocular Movements: Extraocular movements intact. Cardiovascular:      Rate and Rhythm: Normal rate. Pulses: Normal pulses. Pulmonary:      Effort: Pulmonary effort is normal. No respiratory distress. Abdominal:      General: There is no distension. Musculoskeletal:         General: Normal range of motion. Cervical back: Normal range of motion. Skin:     General: Skin is dry. Neurological:      General: No focal deficit present. Mental Status: She is alert and oriented to person, place, and time. Psychiatric:         Mood and Affect: Mood normal.          MDM  Number of Diagnoses or Management Options  Other migraine with status migrainosus, intractable  Diagnosis management comments:     Patient presents with a headache which is somewhat different from her normal migraine. It was sudden onset concerning for subarachnoid hemorrhage. Benign migraine is also possibility. Patient was treated with migraine cocktail. CT imaging was obtained and was unremarkable. Patient reports significant improvement in her symptoms. Discussed my clinical impression(s), any labs and/or radiology results with the patient. I answered any questions and addressed any concerns. Discussed the importance of following up with their primary care physician and/or specialist(s). Discussed signs or symptoms that would warrant return back to the ER for further evaluation. The patient is agreeable with discharge.        Amount and/or Complexity of Data Reviewed  Clinical lab tests: ordered and reviewed  Tests in the radiology section of CPT®: ordered and reviewed           Procedures

## 2022-05-10 NOTE — PROGRESS NOTES
I called and spoke with patient concerning CT final read indicating cystic structure to brain and need for MRI. Recommend patient contact pcp or neurologist for MRI or to return to ER.   Pt acknowledges understanding

## 2022-05-11 DIAGNOSIS — G93.0 BRAIN CYST: Primary | ICD-10-CM

## 2022-05-18 RX ORDER — VERAPAMIL HYDROCHLORIDE 120 MG/1
CAPSULE, EXTENDED RELEASE ORAL
Qty: 30 CAPSULE | Refills: 2 | Status: SHIPPED | OUTPATIENT
Start: 2022-05-18 | End: 2022-07-07

## 2022-05-23 ENCOUNTER — HOSPITAL ENCOUNTER (OUTPATIENT)
Dept: MRI IMAGING | Age: 47
Discharge: HOME | End: 2022-05-23
Attending: INTERNAL MEDICINE
Payer: COMMERCIAL

## 2022-05-23 VITALS — WEIGHT: 206 LBS | BODY MASS INDEX: 32.26 KG/M2

## 2022-05-23 DIAGNOSIS — G93.0 BRAIN CYST: ICD-10-CM

## 2022-05-23 DIAGNOSIS — G93.9 LESION OF BRAIN: Primary | ICD-10-CM

## 2022-05-23 PROCEDURE — 74011250636 HC RX REV CODE- 250/636: Performed by: INTERNAL MEDICINE

## 2022-05-23 PROCEDURE — 70553 MRI BRAIN STEM W/O & W/DYE: CPT

## 2022-05-23 PROCEDURE — A9576 INJ PROHANCE MULTIPACK: HCPCS | Performed by: INTERNAL MEDICINE

## 2022-05-23 RX ADMIN — GADOTERIDOL 18 ML: 279.3 INJECTION, SOLUTION INTRAVENOUS at 13:12

## 2022-05-24 NOTE — PROGRESS NOTES
I have requested a neurosurgical opinion and if you do not hear from our office please call and ask for the

## 2022-06-16 ENCOUNTER — OFFICE VISIT (OUTPATIENT)
Dept: OBGYN CLINIC | Age: 47
End: 2022-06-16
Payer: COMMERCIAL

## 2022-06-16 VITALS
DIASTOLIC BLOOD PRESSURE: 80 MMHG | SYSTOLIC BLOOD PRESSURE: 124 MMHG | BODY MASS INDEX: 32.49 KG/M2 | WEIGHT: 207 LBS | HEIGHT: 67 IN

## 2022-06-16 DIAGNOSIS — Z12.31 BREAST CANCER SCREENING BY MAMMOGRAM: ICD-10-CM

## 2022-06-16 DIAGNOSIS — Z01.419 ENCOUNTER FOR GYNECOLOGICAL EXAMINATION WITHOUT ABNORMAL FINDING: Primary | ICD-10-CM

## 2022-06-16 DIAGNOSIS — Z01.419 ENCOUNTER FOR ROUTINE GYNECOLOGICAL EXAMINATION WITH PAPANICOLAOU SMEAR OF CERVIX: ICD-10-CM

## 2022-06-16 PROCEDURE — 99396 PREV VISIT EST AGE 40-64: CPT | Performed by: OBSTETRICS & GYNECOLOGY

## 2022-06-16 RX ORDER — LEVONORGESTREL AND ETHINYL ESTRADIOL 0.15-0.03
1 KIT ORAL DAILY
Qty: 3 DOSE PACK | Refills: 4 | Status: SHIPPED | OUTPATIENT
Start: 2022-06-16

## 2022-06-16 RX ORDER — ACETAZOLAMIDE 250 MG/1
250 TABLET ORAL 2 TIMES DAILY
COMMUNITY

## 2022-06-16 NOTE — PROGRESS NOTES
Annual exam    Cortes Seay is a 55 y.o.  presenting for annual exam. Her main concerns today include obtaining refills of her birth control. She is doing well, amenorrheic on current OCP. Occasional spotting. She is wondering about perimenopause. Take verapamil for migraines. She declines a chaperone during the gynecologic exam today. 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    1.2 x 1.8 cm cystic appearing lesion in the right aspect of the clivus and petrous apex, new since     Depression with anxiety 2018    Endometriosis     fredy valerio md    Family history of ovarian cancer 2014    Fibroids     Gastritis 2018    EGD -  angel Ball md    GERD (gastroesophageal reflux disease)     reflux    H/O gastric bypass 2018    gastric bypass angel ball [facility] - [facility]    Heart murmur     heart mumor     Hx of gastric bypass 10/21/2015    laparoscopic sleeve gastrectomy - inital weight 266 - Angel Ball md    Iron deficiency     Migraines 2011    Obesity (BMI 30-39. 9)     Scoliosis of thoracic spine 2010    mri    Thyroid nodule        Past Surgical History:   Procedure Laterality Date    HX BREAST BIOPSY Left     incomplete.  could not locate lump    HX  SECTION      HX CHOLECYSTECTOMY  2013    [facility] HX GASTRIC BYPASS      HX GI      HX HEENT      HX MYOMECTOMY  5/17/10    HX OTHER SURGICAL      pilonidal cyst    HX OTHER SURGICAL      tonsillectomy    HX OTHER SURGICAL      cyst removed lip    HX OTHER SURGICAL      fibroids removed    SD MYOMECTOMY 1-4,W/TOT 250GMS/<,ABD [facility]  5/17/10       Family History   Problem Relation Age of Onset    Heart Disease Mother     Breast Cancer Mother         unsure of breast ca diagnosis    Ovarian Cancer Mother     Colon Cancer Father     Kidney Disease Father     Migraines Sister     Other Sister         Maeve Rollins    Seizures Sister         Since Childhood    GERD Son        Social History     Socioeconomic History    Marital status:      Spouse name: Not on file    Number of children: Not on file    Years of education: Not on file    Highest education level: Not on file   Occupational History    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 Never drinker, and does not abuse drugs. Social History:  The patient is  to her second  of 5 years duration and now    She has one child who is 6years old, a son. 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 She was going to [employer] but she is considering going to [employer]. Family History:  Father is alive and well. Mother  at age 64 of ventricular atrial fibrillation. Other medical problems included end stage renal disease on dialysis, diabetes, heart disease, obesity. She has two sisters, alive and well.      Social Determinants of Health     Financial Resource Strain:     Difficulty of Paying Living Expenses: Not on file   Food Insecurity:     Worried About Running Out of Food in the Last Year: Not on file    [address] in the Last Year: Not on file   Transportation Needs:     Lack of Transportation (Medical): Not on file    Lack of Transportation (Non-Medical): Not on file   Physical Activity:     Days of Exercise per Week: Not on file    Minutes of Exercise per Session: Not on file   Stress:     Feeling of Stress : Not on file   Social Connections:     Frequency of Communication with Friends and Family: Not on file    Frequency of Social Gatherings with Friends and Family: Not on file    Attends Pentecostalism Services: Not on file    Active Member of [address] [place] [address] [employer] or Organizations: Not on file    Attends Club or Organization Meetings: Not on file    Marital Status: Not on file   Intimate Partner Violence:     Fear of Current or Ex-Partner: Not on file    Emotionally Abused: Not on file    Physically Abused: Not on file    Sexually Abused: Not on file   Housing Stability:     Unable to Pay for Housing in the Last Year: Not on file    Number of [place] in the Last Year: Not on file    Unstable Housing in the Last Year: Not on file       Current Outpatient Medications   Medication Sig Dispense Refill    acetaZOLAMIDE (DIAMOX) 250 mg tablet Take 250 mg by mouth two (2) times a day.  levonorgestrel-ethinyl estradiol (Altavera, 28,) 0.15-0.03 mg tab Take 1 Tablet by mouth daily. TAKE 1 TABLET BY MOUTH EVERY DAY, SKIP PLACEBO PILLS AS DIRECTED 3 Dose Pack 4    verapamil ER (VERELAN) 120 mg ER capsule TAKE 1 CAPSULE BY MOUTH EVERY DAY 30 Capsule 2    SUMAtriptan (IMITREX) 100 mg tablet Take 1 Tablet by mouth daily as needed for Migraine. 9 Tablet 4    busPIRone (BUSPAR) 7.5 mg tablet TAKE 1 TABLET BY MOUTH THREE TIMES A DAY 90 Tablet 5    sertraline (ZOLOFT) 100 mg tablet TAKE 1 TABLET BY MOUTH EVERY DAY 30 Tablet 11    ferrous gluconate 324 mg (37.5 mg iron) tablet Take 1 Tablet by mouth every other day.  30 Tablet 11       Allergies   Allergen Reactions    Pertussis Vaccines Other (comments)     Shortness of breathe    Mouth Cleanser [Carbamide Peroxide] Hives     glyoxide    Pineapple Hives    Rondec [Brompheniramine-Pseudoephedrin] Hives    Seafood Other (comments)     Patient states her left knee swells    Shellfish Containing Products Hives    Tetracycline Hives       Review of Systems - History obtained from the patient  Constitutional: negative for weight loss, fever, night sweats  HEENT: negative for hearing loss, earache, congestion, snoring, sorethroat  CV: negative for chest pain, palpitations, edema  Resp: negative for cough, shortness of breath, wheezing  GI: negative for change in bowel habits, abdominal pain, black or bloody stools  : negative for frequency, dysuria, hematuria, vaginal discharge  MSK: negative for back pain, joint pain, muscle pain  Breast: negative for breast lumps, nipple discharge, galactorrhea  Skin :negative for itching, rash, hives  Neuro: negative for dizziness, headache, confusion, weakness  Psych: negative for anxiety, depression, change in mood  Heme/lymph: negative for bleeding, bruising, pallor    Physical Exam    Visit Vitals  /80 (BP 1 Location: Left arm)   Ht 5' 7\" (1.702 m)   Wt 207 lb (93.9 kg)   BMI 32.42 kg/m²       Constitutional  · Appearance: well-nourished, well developed, alert, in no acute distress    HENT  · Head and Face: appears normal    Neck  · Inspection/Palpation: normal appearance, no masses or tenderness  · Lymph Nodes: no lymphadenopathy present  · Thyroid: gland size normal, nontender, no nodules or masses present on palpation    Chest  · Respiratory Effort: non-labored breathing  · Auscultation: CTAB, normal breath sounds    Cardiovascular  · Heart:  · Auscultation: regular rate and rhythm without murmur  · Extremities: no peripheral edema    Breasts  · Inspection of Breasts: breasts symmetrical, no skin changes, no discharge present, nipple appearance normal, no skin retraction present  · Palpation of Breasts and Axillae: no masses present on palpation, no breast tenderness  · Axillary Lymph Nodes: no lymphadenopathy present    Gastrointestinal  · Abdominal Examination: abdomen non-tender to palpation, normal bowel sounds, no masses present  · Liver and spleen: no hepatomegaly present, spleen not palpable  · Hernias: no hernias identified    Genitourinary  · External Genitalia: normal appearance for age, no discharge present, no tenderness present, no inflammatory lesions present, no masses present  · Vagina: normal vaginal vault without central or paravaginal defects, no discharge present, no inflammatory lesions present, no masses present  · Bladder: non-tender to palpation  · Urethra: appears normal  · Cervix: normal   · Uterus: normal size, shape and consistency, small, mobile  · Adnexa: no adnexal tenderness present, no adnexal masses present  · Perineum: perineum within normal limits, no evidence of trauma, no rashes or skin lesions present    Skin  · General Inspection: no rash, no lesions identified    Neurologic/Psychiatric  · Mental Status:  · Orientation: grossly oriented to person, place and time  · Mood and Affect: mood normal, affect appropriate      Assessment/Plan:  55 y.o. female presenting for her annual exam. Overall doing well. Well woman exam:  Normal gynecologic and breast exams. Healthy habits and lifestyle reviewed. Pap with HPV UTD. Patient declines STD screening. Mammogram order placed. Rx OCPs refilled. Discussed recommendations for colonoscopy.       Kianna Corbin MD

## 2022-06-29 RX ORDER — BUSPIRONE HYDROCHLORIDE 7.5 MG/1
TABLET ORAL
Qty: 90 TABLET | Refills: 5 | Status: SHIPPED | OUTPATIENT
Start: 2022-06-29

## 2022-07-07 ENCOUNTER — OFFICE VISIT (OUTPATIENT)
Dept: NEUROLOGY | Age: 47
End: 2022-07-07
Payer: COMMERCIAL

## 2022-07-07 VITALS
OXYGEN SATURATION: 98 % | HEART RATE: 70 BPM | WEIGHT: 207.8 LBS | BODY MASS INDEX: 32.62 KG/M2 | SYSTOLIC BLOOD PRESSURE: 127 MMHG | DIASTOLIC BLOOD PRESSURE: 63 MMHG | HEIGHT: 67 IN

## 2022-07-07 DIAGNOSIS — G43.109 MIGRAINE WITH AURA AND WITHOUT STATUS MIGRAINOSUS, NOT INTRACTABLE: Primary | ICD-10-CM

## 2022-07-07 PROCEDURE — 99213 OFFICE O/P EST LOW 20 MIN: CPT | Performed by: PSYCHIATRY & NEUROLOGY

## 2022-07-07 RX ORDER — PHENTERMINE HYDROCHLORIDE 30 MG/1
CAPSULE ORAL
COMMUNITY
Start: 2022-05-19 | End: 2022-10-25

## 2022-07-07 RX ORDER — OMEPRAZOLE 40 MG/1
CAPSULE, DELAYED RELEASE ORAL
COMMUNITY

## 2022-07-07 RX ORDER — AMITRIPTYLINE HYDROCHLORIDE 10 MG/1
10 TABLET, FILM COATED ORAL
Qty: 90 TABLET | Refills: 1 | Status: SHIPPED | OUTPATIENT
Start: 2022-07-07 | End: 2022-10-25

## 2022-07-07 RX ORDER — ZOLMITRIPTAN 5 MG/1
5 TABLET, ORALLY DISINTEGRATING ORAL
Qty: 9 TABLET | Refills: 11 | Status: SHIPPED | OUTPATIENT
Start: 2022-07-07 | End: 2022-08-18

## 2022-07-07 NOTE — PROGRESS NOTES
Neurology Consult Note      HISTORY PROVIDED BY: patient and son    Chief Complaint:   Chief Complaint   Patient presents with    Follow-up    Migraine      Subjective:   Pt is a 47y.o. right handed female initially and last seen on 9/15/21 with onset of HAs in her 19's, typically pain is right sided periorbital pressure that then spreads, throbbing and aching pain, +N/V/P/P, blurry vision, two years ago had right facial numbness that increased with pain, has seen stars then splotches in vision that obscured vision. Were 0-2 times a week until COVID-19 infection in Dec, 2020, now 3-4 per week. +Family h/o MHAs in Mom and sister. Exam with BMI 32.3 o/w non-focal and unremarkable. HA history was c/w migraine headache. Started Verapamil CR 120mg daily for MHA prevention. Started Imitrex 100mg PO at onset of HA or aura. Pt was asked to keep a HA log and call in 8 weeks if no improvement in HAs. Encouraged to continue excellent water intake and limited caffeine intake. She returns for f/u. She feels like her HAs are about the same, 3-4 HAs per week. Still taking Verapamil CR 120mg. She is taking OTC meds and Imitrex for abortive tx, but has side effects of hypersensitivity after using Imitrex. She had a severe HA 5/10/22 and had a CT/CTA in the ED which revealed incidental 1.2 x 1.8 cystic lesion in right aspect of the clivus and petrous apex, new since 2007, MRI brain w/wo 5/23/22 confirmed 2.4x1. 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  She was seen by Dr. Odessa Bates and started on Diamox, planned repeat f/u imaging in 6 months.      Previous Prevention Meds:  Topamax      Past Medical History:   Diagnosis Date    Abnormal Pap smear     2006-recheck and was normal    Asthma     Proventil inhaler PRN    Cyst of brain 05/10/2022    1.2 x 1.8 cm cystic appearing lesion in the right aspect of the clivus and petrous apex, new since     Depression with anxiety 2018    Endometriosis     fredy valerio md    Family history of ovarian cancer 2014    Fibroids     Gastritis 2018    EGD -  angel Ball md    GERD (gastroesophageal reflux disease)     reflux    H/O gastric bypass 2018    gastric bypass [facility]    Heart murmur     heart mumor     Hx of gastric bypass 10/21/2015    laparoscopic sleeve gastrectomy - inital weight 266 - Angel Ball md    Iron deficiency     Migraines 2011    Obesity (BMI 30-39. 9)     Scoliosis of thoracic spine 2010    mri    Thyroid nodule       Past Surgical History:   Procedure Laterality Date    HX BREAST BIOPSY Left     incomplete. could not locate lump    HX  SECTION      HX CHOLECYSTECTOMY  2013    [facility]vin [facility] HX GASTRIC BYPASS      HX GI      HX HEENT      HX MYOMECTOMY  5/17/10    HX OTHER SURGICAL      pilonidal cyst    HX OTHER SURGICAL      tonsillectomy    HX OTHER SURGICAL      cyst removed lip    HX OTHER SURGICAL      fibroids removed    SC MYOMECTOMY 1-4,W/TOT 250GMS/<, [address]  5/17/10      Social History     Socioeconomic History    Marital status:      Spouse name: Not on file    Number of children: Not on file    Years of education: Not on file    Highest education level: Not on file   Occupational History    Occupation: LPN at [employer]    Smoking status: Never Smoker    Smokeless tobacco: Never Used   Vaping Use    Vaping Use: Never used   Substance and Sexual Activity    Alcohol use: Yes     Alcohol/week: 1.0 standard drink     Types: 1 Glasses of wine per week     Comment: occasional (once a week)    Drug use: No    Sexual activity: Yes     Partners: Male     Birth control/protection: Pill   Other Topics Concern    Not on file   Social History Narrative    Habits:  She is a never smoker.   Never drinker, and does not abuse drugs. Social History:  The patient is  to her second  of 5 years duration and now    She has one child who is 6years old, a son. 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 She was going to [employer] but she is considering going to [employer]. Family History:  Father is alive and well. Mother  at age 64 of ventricular atrial fibrillation. Other medical problems included end stage renal disease on dialysis, diabetes, heart disease, obesity. She has two sisters, alive and well. Social Determinants of Health     Financial Resource Strain:     Difficulty of Paying Living Expenses: Not on file   Food Insecurity:     Worried About Running Out of Food in the Last Year: Not on file    Dilip of Food in the Last Year: Not on file   Transportation Needs:     Lack of Transportation (Medical): Not on file    Lack of Transportation (Non-Medical):  Not on file   Physical Activity:     Days of Exercise per Week: Not on file    Minutes of Exercise per Session: Not on file   Stress:     Feeling of Stress : Not on file   Social Connections:     Frequency of Communication with Friends and Family: Not on file    Frequency of Social Gatherings with Friends and Family: Not on file    Attends Judaism Services: Not on file    Active Member of [address] [place] [address] [employer] or Organizations: Not on file    Attends Club or Organization Meetings: Not on file    Marital Status: Not on file   Intimate Partner Violence:     Fear of Current or Ex-Partner: Not on file    Emotionally Abused: Not on file    Physically Abused: Not on file    Sexually Abused: Not on file   Housing Stability:     Unable to Pay for Housing in the Last Year: Not on file    Number of [place] in the Last Year: Not on file    Unstable Housing in the Last Year: Not on file     Family History   Problem Relation Age of Onset    Heart Disease Mother     Breast Cancer Mother         unsure of breast ca diagnosis    Ovarian Cancer Mother     Colon Cancer Father     Kidney Disease Father     Migraines Sister     Other Sister         Elhers Danlos    Seizures Sister         Since Childhood    GERD Son          Objective:   Review of Systems   Respiratory:        Snoring     Neurological: Positive for headaches. Psychiatric/Behavioral: Positive for depression. The patient is nervous/anxious. All other systems reviewed and are negative. Allergies   Allergen Reactions    Pertussis Vaccines Other (comments)     Shortness of breathe    Mouth Cleanser [Carbamide Peroxide] Hives     glyoxide    Pineapple Hives    Rondec [Brompheniramine-Pseudoephedrin] Hives    Seafood Other (comments)     Patient states her left knee swells    Shellfish Containing Products Hives    Tetracycline Hives        Meds:  Outpatient Medications Prior to Visit   Medication Sig Dispense Refill    omeprazole (PRILOSEC) 40 mg capsule omeprazole 40 mg capsule,delayed release      phentermine 30 mg capsule TAKE 1 CAPSULE BY MOUTH ONCE DAILY BEFORE BREAKFAST      busPIRone (BUSPAR) 7.5 mg tablet TAKE 1 TABLET BY MOUTH THREE TIMES A DAY 90 Tablet 5    acetaZOLAMIDE (DIAMOX) 250 mg tablet Take 250 mg by mouth two (2) times a day.  levonorgestrel-ethinyl estradiol (Altavera, 28,) 0.15-0.03 mg tab Take 1 Tablet by mouth daily. TAKE 1 TABLET BY MOUTH EVERY DAY, SKIP PLACEBO PILLS AS DIRECTED 3 Dose Pack 4    verapamil ER (VERELAN) 120 mg ER capsule TAKE 1 CAPSULE BY MOUTH EVERY DAY 30 Capsule 2    SUMAtriptan (IMITREX) 100 mg tablet Take 1 Tablet by mouth daily as needed for Migraine. 9 Tablet 4    sertraline (ZOLOFT) 100 mg tablet TAKE 1 TABLET BY MOUTH EVERY DAY 30 Tablet 11    ferrous gluconate 324 mg (37.5 mg iron) tablet Take 1 Tablet by mouth every other day. 30 Tablet 11     No facility-administered medications prior to visit.        Imaging:  MRI Results (most recent):  Results from [place] encounter on 03/01/10    MRI SPINE THORACIC W AND W/O CONT    Narrative  *Final Report*    ICD Codes / Adm. Diagnosis:    /   RADICULOPATHY  Examination:  T SPINE MRI W AND WO CON  - [id number] - Mar  1 2010  7:50PM  Accession No:  [id number]  Reason:  RADICULOPATHY      REPORT:  Thoracic spine MRI examination consists of sagittal STIR and T2 and T1 and  postgadolinium T1 sagittal images. Also axial T2 and T1 and postgadolinium  axial T1-weighted images. 20 mL IV Magnevist.    The cord is of normal size and signal intensity. STIR images demonstrate no  pathologic high signal intensity foci in thoracic spine bone marrow. No  intra or extradural mass is demonstrated. There is some dextro-convex  scoliosis. No intra or extradural mass is demonstrated. No pathologic  enhancement is demonstrated following contrast administration. IMPRESSION:  1. Scoliosis    Interpreting/Reading Doctor: Joseph Mcelroy ([id number])  Transcribed: n/a on 03/02/2010  Approved: Joseph Mcelroy ([id number])  03/02/2010    Distribution:  Attending Doctor: Jama Reina  Alternate Doctor: Jama Reina   CT Results (most recent):  Results from [place] encounter on 05/10/22    CTA HEAD NECK W CONT    Narrative  INDICATION: headache    EXAMINATION:  CT ANGIOGRAPHY HEAD AND NECK    COMPARISON: CT head earlier today and December 12, 2007    TECHNIQUE:  Following the uneventful administration of iodinated contrast  material, axial CT angiography of the head and neck was performed. Delayed axial  images through the head were also obtained. Coronal and sagittal reconstructions  were obtained. Manual postprocessing of images was performed. 3-D  Sagittal  maximal intensity projection images were obtained. 3-D Coronal maximal  intensity projections were obtained.   CT dose reduction was achieved through use  of a standardized protocol tailored for this examination and automatic exposure  control for dose modulation. FINDINGS:    CTA NECK:    Aortic Arch: No significant abnormality. Right Common Carotid Artery: No significant abnormality. Right Internal Carotid Artery: No significant abnormality. NASCET Right: 0-25%. Left Common Carotid Artery: No significant abnormality. Left Internal Carotid Artery: No significant abnormality. NASCET Left: 0-25%. Carotid stenosis determined using NASCET criteria. Right Vertebral Artery: No significant abnormality. Left Vertebral Artery: No significant abnormality. Cervical Soft Tissues: No significant abnormality. Lung Apices: No significant abnormality. Bones: No destructive bone lesion. Additional Comments: N/A.    CTA HEAD:    Posterior Circulation: No flow limiting stenosis or occlusion. Anterior Circulation: No flow limiting stenosis or occlusion. Additional Comments: No evidence of aneurysm or vascular malformation. Incidentally, there is a low-density lesion in the right aspect of the clivus  and petrous apex, measuring 1.2 x 1.8 cm, with no clear osseous margin laterally  at the carotid canal and posteriorly between it and the basilar artery. Hounsfield units measure -3. No associated enhancement. Clearly present 2007 head CT. Note: Cerebral perfusion not performed. Impression  1. No acute process. No large vessel occlusion, arterial dissection, or  flow-limiting stenosis. 2. CT perfusion not performed. 3. Incidental 1.2 x 1.8 cm cystic appearing lesion in the right aspect of the  clivus and petrous apex, new since 2007. Indistinct bony margins along the  medial and posterior portion of the lesion. Consider MRI for further  characterization/delineation of the lesion.        Reviewed records in [facility] tab today    Lab Review   Results for orders placed or performed during the hospital encounter of 05/10/22   CBC WITH AUTOMATED DIFF   Result Value Ref Range    WBC 3.9 3.6 - 11.0 K/uL    RBC 4.02 3.80 - 5.20 M/uL    HGB 10.1 (L) 11.5 - 16.0 g/dL    HCT 33.1 (L) 35.0 - 47.0 %    MCV 82.3 80.0 - 99.0 FL    MCH 25.1 (L) 26.0 - 34.0 PG    MCHC 30.5 30.0 - 36.5 g/dL    RDW 15.8 (H) 11.5 - 14.5 %    PLATELET 877 572 - 299 K/uL    MPV 10.4 8.9 - 12.9 FL    NRBC 0.0 0  WBC    ABSOLUTE NRBC 0.00 0.00 - 0.01 K/uL    NEUTROPHILS 46 32 - 75 %    LYMPHOCYTES 37 12 - 49 %    MONOCYTES 13 5 - 13 %    EOSINOPHILS 3 0 - 7 %    BASOPHILS 1 0 - 1 %    IMMATURE GRANULOCYTES 0 0.0 - 0.5 %    ABS. NEUTROPHILS 1.8 1.8 - 8.0 K/UL    ABS. LYMPHOCYTES 1.5 0.8 - 3.5 K/UL    ABS. MONOCYTES 0.5 0.0 - 1.0 K/UL    ABS. EOSINOPHILS 0.1 0.0 - 0.4 K/UL    ABS. BASOPHILS 0.1 0.0 - 0.1 K/UL    ABS. IMM. GRANS. 0.0 0.00 - 0.04 K/UL    DF AUTOMATED     METABOLIC PANEL, COMPREHENSIVE   Result Value Ref Range    Sodium 138 136 - 145 mmol/L    Potassium 4.7 3.5 - 5.1 mmol/L    Chloride 104 97 - 108 mmol/L    CO2 25 21 - 32 mmol/L    Anion gap 9 5 - 15 mmol/L    Glucose 79 65 - 100 mg/dL    BUN 10 6 - 20 MG/DL    Creatinine 0.71 0.55 - 1.02 MG/DL    BUN/Creatinine ratio 14 12 - 20      GFR est AA >60 >60 ml/min/1.73m2    GFR est non-AA >60 >60 ml/min/1.73m2    Calcium 8.5 8.5 - 10.1 MG/DL    Bilirubin, total 0.2 0.2 - 1.0 MG/DL    ALT (SGPT) 28 12 - 78 U/L    AST (SGOT) 22 15 - 37 U/L    Alk. phosphatase 77 45 - 117 U/L    Protein, total 7.5 6.4 - 8.2 g/dL    Albumin 3.6 3.5 - 5.0 g/dL    Globulin 3.9 2.0 - 4.0 g/dL    A-G Ratio 0.9 (L) 1.1 - 2.2     HCG URINE, QL. - POC   Result Value Ref Range    Pregnancy test,urine (POC) Negative NEG          Exam:  Visit Vitals  /63   Pulse 70   Ht 5' 7\" (1.702 m)   Wt 207 lb 12.8 oz (94.3 kg)   SpO2 98%   BMI 32.55 kg/m²     General:  Alert, cooperative, no distress. Head:  Normocephalic, without obvious abnormality, atraumatic. Respiratory:  Heart:   Non labored breathing  Regular rate and rhythm, no murmurs   Neck:      Extremities: Warm, no cyanosis or edema. Pulses: 2+ radial pulses. Neurologic:  MS: Alert and oriented x 4, speech intact. Language intact. Attention and fund of knowledge appropriate. Recent and remote memory intact. Cranial Nerves:  II: visual fields Full to confrontation   II: pupils    II: optic disc    III,VII: ptosis none   III,IV,VI: extraocular muscles  EOMI, no nystagmus or diplopia   V: facial light touch sensation     VII: facial muscle function   symmetric   VIII: hearing intact   IX: soft palate elevation     XI: trapezius strength     XI: sternocleidomastoid strength    XII: tongue     Exam 9/15/21:  Motor: normal bulk and tone, no tremor              Strength: 5/5 throughout, no PD  Sensory: intact to LT, PP  Coordination: FTN and HTS intact, ALE intact  Gait: normal gait, able to tandem walk  Reflexes:  2+ symmetric, toes downgoing     Assessment/Plan   Pt is a 47y.o. right handed female presenting in Sept, 2021 with onset of MHAs in her 19's, typically pain is right sided periorbital pressure that then spreads, throbbing and aching pain, +N/V/P/P, blurry vision, two years ago had right facial numbness that increased with pain, has seen stars then splotches in vision that obscured vision. Were 0-2 times a week until COVID-19 infection in Dec, 2020, now 3-4 per week without improvement on Verapamil. +Family h/o MHAs in Mom and sister. MRI brain w/wo 5/23/22 confirmed 2.4x1. 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 Exam with BMI 32.5 o/w non-focal and unremarkable. HAs most c/w MHA, unclear that this cyst is cause of HAs. Recommend stopping Verapamil. Start amitriptyline 10mg qhs, side effects reviewed. Stop Imitrex due to side effects. Start Zomig 5mg at onset of HA or aura for abortive tx. Pt instructed to call in 6-8 weeks if no improvement in HAs. Continue to f/u with Dr. Keith Willis for management of cephalocele.  Will request note from NSG. F/u in clinic at next available appt in 6 months, instructed to call in the interim if needed. ICD-10-CM ICD-9-CM    1. Migraine with aura and without status migrainosus, not intractable  G43.109 346.00        Signed:   Emerald Kang MD  7/7/2022

## 2022-07-07 NOTE — LETTER
7/8/2022    Patient: Isabella Salvador   YOB: 1975   Date of Visit: 7/7/2022     Debby Lancaster MD  [id number] [address]  [address] [place] [address]  Via In [facility] [address]    Dear Debby Lnacaster MD,      Thank you for referring Ms. Mauricio Molina to [address] [place] [address] for evaluation. My notes for this consultation are attached. If you have questions, please do not hesitate to call me. I look forward to following your patient along with you.       Sincerely,    Axel Person MD

## 2022-07-13 DIAGNOSIS — F41.8 DEPRESSION WITH ANXIETY: ICD-10-CM

## 2022-07-13 RX ORDER — SERTRALINE HYDROCHLORIDE 100 MG/1
100 TABLET, FILM COATED ORAL DAILY
Qty: 30 TABLET | Refills: 11 | Status: SHIPPED | OUTPATIENT
Start: 2022-07-13

## 2022-07-22 ENCOUNTER — HOSPITAL ENCOUNTER (OUTPATIENT)
Dept: MAMMOGRAPHY | Age: 47
Discharge: HOME | End: 2022-07-22
Attending: OBSTETRICS & GYNECOLOGY
Payer: COMMERCIAL

## 2022-07-22 DIAGNOSIS — Z12.31 BREAST CANCER SCREENING BY MAMMOGRAM: ICD-10-CM

## 2022-07-22 PROCEDURE — 77063 BREAST TOMOSYNTHESIS BI: CPT

## 2022-08-10 ENCOUNTER — TELEPHONE (OUTPATIENT)
Dept: NEUROLOGY | Age: 47
End: 2022-08-10

## 2022-08-10 NOTE — TELEPHONE ENCOUNTER
Spoke with patient, contact verified. Patient reports persistent headache x3 days without any relief. Taking amitriptyline and PRN zomig as directed with no improvement.  Educated

## 2022-08-10 NOTE — TELEPHONE ENCOUNTER
Patient would like a call from the nurse or doctor regarding her medication ZOMIG she feels it is not working. Patient has had a migraine for 4 days straight.     Please contact

## 2022-08-18 RX ORDER — METHYLPREDNISOLONE 4 MG/1
TABLET ORAL
Qty: 1 DOSE PACK | Refills: 0 | Status: SHIPPED | OUTPATIENT
Start: 2022-08-18 | End: 2022-10-25

## 2022-08-18 NOTE — TELEPHONE ENCOUNTER
Called and left a detailed but generic message on generic voicemail. We will send in prescription for a new abortive medication and she should stop the Zomig. I also will send a prescription for a Medrol Dosepak for the next time she gets a headache that is going into day 3, she should stop her abortive medicines like Zomig and start the Medrol Dosepak to break that headache cycle. She was instructed to call back with any questions. (She has taken a medrol dose pack in past.)  Sent Rx for Medrol dose pack and Ubrelvy 100mg tabs.

## 2022-10-10 ENCOUNTER — TELEPHONE (OUTPATIENT)
Dept: NEUROLOGY | Age: 47
End: 2022-10-10

## 2022-10-10 NOTE — TELEPHONE ENCOUNTER
Pt states Rx for Miriam Lively was never authorized and requests an alternative med be sent to the pharmacy.

## 2022-10-11 NOTE — TELEPHONE ENCOUNTER
Called patient. Patient was informed that her medication Ubrelvy 100mg needed a prior authorization. Reports that she just found out from pharmacy told her she needs one. The medication was written on 08/18/22. Patient was informed that she can  some samples as we are waiting for the PA.

## 2022-10-25 ENCOUNTER — OFFICE VISIT (OUTPATIENT)
Dept: ORTHOPEDIC SURGERY | Age: 47
End: 2022-10-25
Payer: COMMERCIAL

## 2022-10-25 VITALS — HEIGHT: 67 IN | WEIGHT: 210 LBS | BODY MASS INDEX: 32.96 KG/M2

## 2022-10-25 DIAGNOSIS — M25.531 RIGHT WRIST PAIN: Primary | ICD-10-CM

## 2022-10-25 DIAGNOSIS — M67.431 GANGLION, RIGHT WRIST: ICD-10-CM

## 2022-10-25 PROCEDURE — 99203 OFFICE O/P NEW LOW 30 MIN: CPT | Performed by: ORTHOPAEDIC SURGERY

## 2022-10-25 NOTE — PROGRESS NOTES
Sarah Correa (: 1975) is a 52 y.o. female patient here for evaluation of the following chief complaint(s):  Wrist Pain (Right wrist ganglion cyst)       ASSESSMENT/PLAN:  Below is the assessment and plan developed based on review of pertinent history, physical exam, labs, studies, and medications. 1. Right wrist pain  -     XR WRIST RT AP/LAT/OBL MIN 3V; Future  2. Ganglion, right wrist      80-year-old female with right volar carpal ganglion cyst.  She states she is ready to have it excised. We discussed recurrence rates. We discussed risks, benefits and alternatives to surgery. After thorough discussion ultimately the patient elected to proceed with operative intervention. We discussed the risks including but not limited to postoperative pain, swelling, bruising, bleeding, scarring, infection, damage to neurovascular structures. Risk of permanent or temporary loss of range of motion, need for additional surgery, rejection of foreign material such as metal, suture or other tissue. Plan is for right volar carpal ganglion cyst excision. Patient verbalized understanding and elected to proceed. All questions were answered to the patient's apparent satisfaction. SUBJECTIVE/OBJECTIVE:  HPI    80-year-old female with right volar wrist mass. States its been present about a year. Seems to come and go in size and can get very painful. Patient reports a sudden onset of symptoms. Duration of problem 1 year.   Symptom Severity 4/10  Symptom Frequency intermittent        Allergies   Allergen Reactions    Pertussis Vaccines Other (comments)     Shortness of breathe    Mouth Cleanser [Carbamide Peroxide] Hives     glyoxide    Pineapple Hives    Rondec [Brompheniramine-Pseudoephedrin] Hives    Seafood Other (comments)     Patient states her left knee swells    Shellfish Containing Products Hives    Tetracycline Hives       Current Outpatient Medications   Medication Sig    sertraline (ZOLOFT) 100 mg tablet Take 1 Tablet by mouth daily. omeprazole (PRILOSEC) 40 mg capsule omeprazole 40 mg capsule,delayed release    busPIRone (BUSPAR) 7.5 mg tablet TAKE 1 TABLET BY MOUTH THREE TIMES A DAY    acetaZOLAMIDE (DIAMOX) 250 mg tablet Take 250 mg by mouth two (2) times a day. levonorgestrel-ethinyl estradiol (Altavera, 28,) 0.15-0.03 mg tab Take 1 Tablet by mouth daily. TAKE 1 TABLET BY MOUTH EVERY DAY, SKIP PLACEBO PILLS AS DIRECTED    ferrous gluconate 324 mg (37.5 mg iron) tablet Take 1 Tablet by mouth every other day. No current facility-administered medications for this visit. Social History     Socioeconomic History    Marital status:      Spouse name: Not on file    Number of children: Not on file    Years of education: Not on file    Highest education level: Not on file   Occupational History    Occupation: LPN at [employer]   Tobacco Use    Smoking status: Never    Smokeless tobacco: Never   Vaping Use    Vaping Use: Never used   Substance and Sexual Activity    Alcohol use: Yes     Alcohol/week: 1.0 standard drink     Types: 1 Glasses of wine per week     Comment: occasional (once a week)    Drug use: No    Sexual activity: Yes     Partners: Male     Birth control/protection: Pill   Other Topics Concern    Not on file   Social History Narrative    Habits:  She is a never smoker. Never drinker, and does not abuse drugs. Social History:  The patient is  to her second  of 5 years duration and now    She has one child who is 6years old, a son. 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 She was going to [employer] but she is considering going to [employer]. Family History:  Father is alive and well. Mother  at age 64 of ventricular atrial fibrillation. Other medical problems included end stage renal disease on dialysis, diabetes, heart disease, obesity.   She has two sisters, alive and well. Social Determinants of Health     Financial Resource Strain: Not on file   Food Insecurity: Not on file   Transportation Needs: Not on file   Physical Activity: Not on file   Stress: Not on file   Social Connections: Not on file   Intimate Partner Violence: Not on file   Housing Stability: Not on file       Past Surgical History:   Procedure Laterality Date    HX BREAST BIOPSY Left     incomplete. could not locate lump    HX  SECTION      HX CHOLECYSTECTOMY  2013    lap kevin at [address]    HX GASTRIC BYPASS      HX GI      HX HEENT      HX MYOMECTOMY  5/17/10    HX OTHER SURGICAL      pilonidal cyst    HX OTHER SURGICAL      tonsillectomy    HX OTHER SURGICAL      cyst removed lip    HX OTHER SURGICAL      fibroids removed    [place] MYOMECTOMY 1-4,W/TOT 250GMS/<,ABD [facility]  5/17/10       Family History   Problem Relation Age of Onset    Heart Disease Mother     Breast Cancer Mother         unsure of breast ca diagnosis    Ovarian Cancer Mother     Colon Cancer Father     Kidney Disease Father     Migraines Sister     Other Sister         Elhers Danlos    Seizures Sister         Since Childhood    GERD Son             Review of Systems    No flowsheet data found. Vitals:  Ht 5' 7\" (1.702 m)   Wt 210 lb (95.3 kg)   BMI 32.89 kg/m²    Estimated body surface area is 2.12 meters squared as calculated from the following:    Height as of this encounter: 5' 7\" (1.702 m). Weight as of this encounter: 210 lb (95.3 kg). Body mass index is 32.89 kg/m². Physical Exam    Musculoskeletal Exam:    Right WRIST EXAM    Evaluation of the wrist shows a 1.5 cm mass in the volar aspect of the wrist.  It is mobile and subcutaneous. There is mild tenderness to palpation. It is compressible. Patient has a 2+ radial pulse. Patient fires AIN, PIN and ulnar nerves. Sensation is grossly intact in the median, radial and ulnar distribution.   Hand is pink and appears well-perfused. Hand is warm. Skin is intact. Compartments are soft and compressible. Consitutional: Healthy  Skin:   - Edema - none  - Cellulitis - No    Neuro: Numbness or tingling in R/L arm: No    Psych: Affect normal    Cardiovascular: Capillary Refill < 2 seconds in upper extremities    Respiratory: Non-Labored Breathing    ROS:    Constitutional: Denies fever/chills    Respiratory: Denies SOB        Imaging:    XR Results (most recent):  Results from Appointment encounter on 10/25/22    XR WRIST RT AP/LAT/OBL MIN 3V    Narrative  Right Wrist Xray  Indication: pain  Views: 3 views, AP/LAT/OBL    Interpretation: 3 views of the right wrist are reviewed and show normal bone architecture and no evidence of fracture or arthritis. The carpal alignment appears normal with no evidence of major ligament tear. The radiocarpal, midcarpal and scapholunate relationships are normal.  There is no evidence of soft tissue swelling. Orders Placed This Encounter    XR WRIST RT AP/LAT/OBL MIN 3V     Standing Status:   Future     Number of Occurrences:   1     Standing Expiration Date:   10/26/2023     Order Specific Question:   Is Patient Pregnant? Answer:   No            An electronic signature was used to authenticate this note.   -- Park Lord MD

## 2022-11-07 NOTE — TELEPHONE ENCOUNTER
Called patient. Patient was informed that the medication [employer] approved through 10-17-22 to 10-17-23. Patient will need a new script as the other one has .

## 2022-11-07 NOTE — TELEPHONE ENCOUNTER
Re: Isrrael Aden    [facility] [place] approval effective 10/17/22-10/17/23 - scanned letter to chart.

## 2022-11-08 ENCOUNTER — TELEPHONE (OUTPATIENT)
Dept: NEUROLOGY | Age: 47
End: 2022-11-08

## 2022-11-08 NOTE — TELEPHONE ENCOUNTER
Re: Purvi Almazan [facility]- called them and they provided old health info. Called pt to confirm correct insurance info. Confirmed and called pharmacy back and updated, they were able to process script with new info.   Pt is able to get 10 per 30 days

## 2023-02-06 ENCOUNTER — TRANSCRIBE ORDER (OUTPATIENT)
Dept: SCHEDULING | Age: 48
End: 2023-02-06

## 2023-02-06 DIAGNOSIS — Q01.9 ENCEPHALOCELE (HCC): Primary | ICD-10-CM

## 2023-02-28 ENCOUNTER — HOSPITAL ENCOUNTER (OUTPATIENT)
Dept: CT IMAGING | Age: 48
Discharge: HOME | End: 2023-02-28
Attending: NEUROLOGICAL SURGERY
Payer: COMMERCIAL

## 2023-02-28 DIAGNOSIS — Q01.9 ENCEPHALOCELE (HCC): ICD-10-CM

## 2023-02-28 PROCEDURE — 70450 CT HEAD/BRAIN W/O DYE: CPT

## 2023-04-06 ENCOUNTER — OFFICE VISIT (OUTPATIENT)
Dept: INTERNAL MEDICINE CLINIC | Age: 48
End: 2023-04-06

## 2023-04-06 PROBLEM — Z00.00 PREVENTATIVE HEALTH CARE: Status: ACTIVE | Noted: 2023-04-06

## 2023-04-06 NOTE — PROGRESS NOTES
Olvierio Mcmillan is a 52 y.o. female    Chief Complaint   Patient presents with    Complete Physical     1. Have you been to the ER, urgent care clinic since your last visit? Hospitalized since your last visit? No    2. Have you seen or consulted any other health care providers outside of the [address] [place] [address] since your last visit? Include any pap smears or colon screening.  No

## 2023-04-06 NOTE — PROGRESS NOTES
SPORTS MEDICINE AND [facility]ARE  Esmeralda Paget, MD, [address] [address]  Phone:  [phone]  Fax: [phone]       Chief Complaint   Patient presents with    Complete Physical   .      SUBJECTIVE:    Brett Osei is a 52 y.o. female  Dictation on: 04/06/2023  4:04 PM by: Brianna Almonte [[id number]]          Current Outpatient Medications   Medication Sig Dispense Refill    busPIRone (BUSPAR) 7.5 mg tablet Take 1 Tablet by mouth three (3) times daily. 90 Tablet 5    sertraline (ZOLOFT) 50 mg tablet Take 3 Tablets by mouth daily. 90 Tablet 5    butalbital-acetaminophen-caffeine (FIORICET, ESGIC) -40 mg per tablet Take 1 Tablet by mouth every four (4) hours as needed for Headache. 60 Tablet 2    ubrogepant (Ubrelvy) 100 mg tablet Take 1 Tablet by mouth daily as needed for Migraine. 8 Tablet 11    sertraline (ZOLOFT) 100 mg tablet Take 1 Tablet by mouth daily. 30 Tablet 11    omeprazole (PRILOSEC) 40 mg capsule omeprazole 40 mg capsule,delayed release      acetaZOLAMIDE (DIAMOX) 250 mg tablet Take 1 Tablet by mouth two (2) times a day. levonorgestrel-ethinyl estradiol (Altavera, 28,) 0.15-0.03 mg tab Take 1 Tablet by mouth daily. TAKE 1 TABLET BY MOUTH EVERY DAY, SKIP PLACEBO PILLS AS DIRECTED 3 Dose Pack 4    ferrous gluconate 324 mg (37.5 mg iron) tablet Take 1 Tablet by mouth every other day.  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    1.2 x 1.8 cm cystic appearing lesion in the right aspect of the clivus and petrous apex, new since 2007    Depression with anxiety 05/29/2018    Endometriosis     fredy valerio md    Family history of ovarian cancer 5/1/2014    Fibroids     Gastritis 05/16/2018    EGD -  angel Ball md    GERD (gastroesophageal reflux disease)     reflux    H/O gastric bypass 05/16/2018    gastric bypass angel md gildardo - [facility]    Heart murmur     heart mumor     Hx of gastric bypass 10/21/2015    laparoscopic sleeve gastrectomy - inital weight 266 - Jos Ball md    Iron deficiency     Migraines 2011    Obesity (BMI 30-39. 9)     Scoliosis of thoracic spine 2010    mri    Thyroid nodule      Past Surgical History:   Procedure Laterality Date    HX BREAST BIOPSY Left     incomplete.  could not locate lump    HX  SECTION      HX CHOLECYSTECTOMY  2013    lap kevin at [facility]    HX GASTRIC BYPASS      HX GI      HX HEENT      HX MYOMECTOMY  5/17/10    HX OTHER SURGICAL      pilonidal cyst    HX OTHER SURGICAL      tonsillectomy    HX OTHER SURGICAL      cyst removed lip    HX OTHER SURGICAL      fibroids removed    MN MYOMECTOMY 1-4 MYOMAS W/250 GM/< ABDOMINAL APPR  5/17/10     Allergies   Allergen Reactions    Pertussis Vaccines Other (comments)     Shortness of breathe    Mouth Cleanser [Carbamide Peroxide] Hives     glyoxide    Pineapple Hives    Rondec [Brompheniramine-Pseudoephedrin] Hives    Seafood Other (comments)     Patient states her left knee swells    Shellfish Containing Products Hives    Tetracycline Hives         REVIEW OF SYSTEMS:  General: negative for - chills or fever  ENT: negative for - headaches, nasal congestion or tinnitus  Respiratory: negative for - cough, hemoptysis, shortness of breath or wheezing  Cardiovascular : negative for - chest pain, edema, palpitations or shortness of breath  Gastrointestinal: negative for - abdominal pain, blood in stools, heartburn or nausea/vomiting  Genito-Urinary: no dysuria, trouble voiding, or hematuria  Musculoskeletal: negative for - gait disturbance, joint pain, joint stiffness or joint swelling  Neurological: no TIA or stroke symptoms  Hematologic: no bruises, no bleeding, no swollen glands  Integument: no lumps, mole changes, nail changes or rash  Endocrine: no malaise/lethargy or unexpected weight changes      Social History     Socioeconomic History    Marital status:    Occupational History    Occupation: LPN at [employer]   Tobacco Use    Smoking status: Never    Smokeless tobacco: Never   Vaping Use    Vaping Use: Never used   Substance and Sexual Activity    Alcohol use: Yes     Alcohol/week: 1.0 standard drink     Types: 1 Glasses of wine per week     Comment: occasional (once a week)    Drug use: No    Sexual activity: Yes     Partners: Male     Birth control/protection: Pill   Social History Narrative    Habits:  She is a never smoker. Never drinker, and does not abuse drugs. Social History:  The patient is  to her second  of 5 years duration and now    She has one child who is 6years old, a son. 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 She was going to [employer] but she is considering going to [employer]. Family History:  Father is alive and well. Mother  at age 64 of ventricular atrial fibrillation. Other medical problems included end stage renal disease on dialysis, diabetes, heart disease, obesity. She has two sisters, alive and well. Family History   Problem Relation Age of Onset    Heart Disease Mother     Breast Cancer Mother         unsure of breast ca diagnosis    Ovarian Cancer Mother     Colon Cancer Father     Kidney Disease Father     Migraines Sister     Other Sister         Elhers Danlos    Seizures Sister         Since Childhood    GERD Son        OBJECTIVE:    Visit Vitals  /85 (BP 1 Location: Right arm, BP Patient Position: Sitting)   Pulse 84   Temp 98.9 °F (37.2 °C) (Oral)   Resp 18   Ht 5' 7\" (1.702 m)   Wt 197 lb 1.6 oz (89.4 kg)   LMP 2023 (Exact Date)   SpO2 97%   BMI 30.87 kg/m²     CONSTITUTIONAL: well , well nourished, appears age appropriate  EYES: perrla, eom intact  ENMT:moist mucous membranes, pharynx clear  NECK: supple.  Thyroid normal  RESPIRATORY: Chest: clear bilaterally   CARDIOVASCULAR: Heart: regular rate and rhythm  GASTROINTESTINAL: Abdomen: soft, bowel sounds active  HEMATOLOGIC: no pathological lymph nodes palpated  MUSCULOSKELETAL: Extremities: no edema, pulse 1+   INTEGUMENT: No unusual rashes or suspicious skin lesions noted. Nails appear normal.  NEUROLOGIC: non-focal exam   MENTAL STATUS: alert and oriented, appropriate affect           ASSESSMENT:  1. Cyst of brain    2. Preventative health care    3. H/O gastric bypass    4. Gastroesophageal reflux disease without esophagitis    5. Thyroid nodule    6. Dyslipidemia    7. Exposure to sexually transmitted disease (STD)       Dictation on: 04/06/2023  4:11 PM by: Arina Jean     I have discussed the diagnosis with the patient and the intended plan as seen in the  Orders. The patient understands and agees with the plan. The patient has   received an after visit summary and questions were answered concerning  future plans  Patient labs and/or xrays were reviewed  Past records were reviewed. PLAN:  .  Orders Placed This Encounter    CBC WITH AUTOMATED DIFF    METABOLIC PANEL, COMPREHENSIVE    LIPID PANEL    TSH 3RD GENERATION    VITAMIN B12 & FOLATE    VITAMIN D, 25 HYDROXY    HEPATITIS PANEL, ACUTE    HIV 1/2 AG/AB, 4TH GENERATION,W RFLX CONFIRM    RPR    CHLAMYDIA / GC-AMPLIFIED    busPIRone (BUSPAR) 7.5 mg tablet    sertraline (ZOLOFT) 50 mg tablet    butalbital-acetaminophen-caffeine (FIORICET, ESGIC) -40 mg per tablet       Follow-up and Dispositions    Return in about 6 months (around 10/6/2023). ATTENTION:   This medical record was transcribed using an electronic medical records system. Although proofread, it may and can contain electronic and spelling errors. Other human spelling and other errors may be present. Corrections may be executed at a later time. Please feel free to contact us for any clarifications as needed.

## 2023-04-20 ENCOUNTER — PATIENT MESSAGE (OUTPATIENT)
Dept: INTERNAL MEDICINE CLINIC | Age: 48
End: 2023-04-20

## 2023-05-06 PROBLEM — Z00.00 PREVENTATIVE HEALTH CARE: Status: RESOLVED | Noted: 2023-04-06 | Resolved: 2023-05-06

## 2023-05-08 DIAGNOSIS — Z01.419 ENCOUNTER FOR GYNECOLOGICAL EXAMINATION (GENERAL) (ROUTINE) WITHOUT ABNORMAL FINDINGS: ICD-10-CM

## 2023-05-08 RX ORDER — LEVONORGESTREL AND ETHINYL ESTRADIOL 0.15-0.03
KIT ORAL
Qty: 28 TABLET | Refills: 14 | OUTPATIENT
Start: 2023-05-08

## 2023-05-15 ENCOUNTER — TELEPHONE (OUTPATIENT)
Age: 48
End: 2023-05-15

## 2023-05-15 RX ORDER — LEVONORGESTREL AND ETHINYL ESTRADIOL 0.15-0.03
1 KIT ORAL DAILY
Qty: 1 PACKET | Refills: 0 | Status: SHIPPED | OUTPATIENT
Start: 2023-05-15

## 2023-05-15 NOTE — TELEPHONE ENCOUNTER
Pt is requesting a birth control refill be sent to [facility] on [address]. There is an encounter in her chart from yesterday but the pharmacy never received it.  Please send the rx to [facility].    CB# is [phone]

## 2023-05-17 RX ORDER — LEVONORGESTREL AND ETHINYL ESTRADIOL 0.15-0.03
KIT ORAL
Qty: 28 TABLET | Refills: 14 | OUTPATIENT
Start: 2023-05-17

## 2023-06-01 ENCOUNTER — TELEPHONE (OUTPATIENT)
Age: 48
End: 2023-06-01

## 2023-06-01 RX ORDER — LEVONORGESTREL AND ETHINYL ESTRADIOL 0.15-0.03
1 KIT ORAL DAILY
Qty: 3 PACKET | Refills: 1 | Status: SHIPPED | OUTPATIENT
Start: 2023-06-01 | End: 2023-06-02 | Stop reason: SDUPTHER

## 2023-06-01 RX ORDER — LEVONORGESTREL AND ETHINYL ESTRADIOL 0.15-0.03
KIT ORAL
Qty: 28 TABLET | OUTPATIENT
Start: 2023-06-01

## 2023-06-01 NOTE — TELEPHONE ENCOUNTER
Patient calling name and  verified. Patient states she needs birth control refilled patient advised she needs an annual appointment scheduled.  Patient scheduled for 2023 per md order will send birth control till annual exam

## 2023-06-02 RX ORDER — LEVONORGESTREL AND ETHINYL ESTRADIOL 0.15-0.03
1 KIT ORAL DAILY
Qty: 3 PACKET | Refills: 1 | Status: SHIPPED | OUTPATIENT
Start: 2023-06-02

## 2023-06-02 NOTE — TELEPHONE ENCOUNTER
Patient calling name and  verified patient looking for her birth control refills patient advised sent yesterday nurse calling pharmacy      Spoke with pharmacy they have refill she can not refill till .     Called patient advised per pharmacy she can not refill until

## 2023-06-07 RX ORDER — SERTRALINE HYDROCHLORIDE 100 MG/1
TABLET, FILM COATED ORAL
Qty: 270 TABLET | Refills: 1 | Status: SHIPPED | OUTPATIENT
Start: 2023-06-07

## 2023-06-07 RX ORDER — BUSPIRONE HYDROCHLORIDE 7.5 MG/1
7.5 TABLET ORAL 3 TIMES DAILY
Qty: 270 TABLET | Refills: 1 | Status: SHIPPED | OUTPATIENT
Start: 2023-06-07

## 2023-07-22 NOTE — PROGRESS NOTES
Problem: Physical Therapy  Goal: Physical Therapy Goal  Description: Goals to be met by: 23     Patient will increase functional independence with mobility by performin. Supine to sit with Modified Travis  2. Rolling to Left and Right with Modified Travis.  3. Sit to stand transfer with Modified Travis  4. Gait  x 100 feet with Modified Travis using Rolling Walker.   5. Lower extremity exercise program x10 reps per handout, with independence    Outcome: Ongoing, Progressing   Initial PT evaluation performed today.  Pt could benefit from skilled PT services 2-3x/wk in order to maximize function prior to D/C.  HHPT and BSC recommended.     1. Have you been to the ER, urgent care clinic since your last visit? Hospitalized since your last visit? No    2. Have you seen or consulted any other health care providers outside of the [address] [place] [address] since your last visit? Include any pap smears or colon screening.  No      Medication refill  Requesting medication for stress

## 2023-08-28 RX ORDER — UBROGEPANT 100 MG/1
TABLET ORAL
Qty: 10 TABLET | Refills: 9 | OUTPATIENT
Start: 2023-08-28

## 2023-08-31 ENCOUNTER — HOSPITAL ENCOUNTER (OUTPATIENT)
Age: 48
Discharge: HOME | End: 2023-08-31
Payer: COMMERCIAL

## 2023-08-31 VITALS — BODY MASS INDEX: 32.18 KG/M2 | WEIGHT: 205 LBS | HEIGHT: 67 IN

## 2023-08-31 DIAGNOSIS — Z12.31 VISIT FOR SCREENING MAMMOGRAM: ICD-10-CM

## 2023-08-31 PROCEDURE — 77063 BREAST TOMOSYNTHESIS BI: CPT

## 2023-09-21 NOTE — PROGRESS NOTES
Annual Exam       Javier Mann is a 50 y.o.  presenting for annual exam. Her main concerns today includes possibly switching her birth control from the pill to an IUD. No issues with the pills, takes continuously to skip menses. 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 Her 11yo son is in 5th grade now. She declines a chaperone during the gynecologic exam today. Ob/Gyn Hx:  G P -   Menses - none  Sexually active - yes  Contraception - Nordette pill    Health maintenance:  Pap - 2020 wnl  Mammo - 2023 BI-RADS 1: Negative. No mammographic evidence of malignancy. 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/2022    1.2 x 1.8 cm cystic appearing lesion in the right aspect of the clivus and petrous apex, new since     Depression with anxiety 2018    Endometriosis     lenora ferreira md    Family history of ovarian cancer 2014    Fibroids     Gastritis 2018    EGD -  chapincito Weinberg md    GERD (gastroesophageal reflux disease)     reflux    H/O gastric bypass 2018    gastric bypass chapincito weinberg md - [facility]    Heart murmur     heart mumor     Hx of gastric bypass 10/21/2015    laparoscopic sleeve gastrectomy - inital weight 266 - Chapincito Weinberg md    Iron deficiency     Migraines 2011    Obesity (BMI 30-39. 9)     Scoliosis of thoracic spine 2010    mri    Thyroid nodule        Past Surgical History:   Procedure Laterality Date    BREAST BIOPSY Left     incomplete.  could not locate lump     SECTION      CHOLECYSTECTOMY  2013    lap niraj at [facility]      GI      HEENT      MYOMECTOMY  5/17/10    MYOMECTOMY 1-4,W/TOT 250GMS/<,ABD APPRCH  5/17/10    OTHER SURGICAL HISTORY      fibroids removed    OTHER SURGICAL HISTORY      cyst removed lip    OTHER SURGICAL

## 2023-09-22 ENCOUNTER — OFFICE VISIT (OUTPATIENT)
Age: 48
End: 2023-09-22

## 2023-09-22 VITALS — WEIGHT: 202 LBS | BODY MASS INDEX: 31.64 KG/M2 | SYSTOLIC BLOOD PRESSURE: 122 MMHG | DIASTOLIC BLOOD PRESSURE: 80 MMHG

## 2023-09-22 DIAGNOSIS — Z01.419 ENCOUNTER FOR GYNECOLOGICAL EXAMINATION: Primary | ICD-10-CM

## 2023-09-22 LAB
HCG, PREGNANCY, URINE, POC: NEGATIVE
VALID INTERNAL CONTROL, POC: YES

## 2023-09-22 RX ORDER — LEVONORGESTREL AND ETHINYL ESTRADIOL 0.15-0.03
1 KIT ORAL DAILY
Qty: 3 PACKET | Refills: 4 | Status: SHIPPED | OUTPATIENT
Start: 2023-09-22

## 2023-09-28 LAB
CYTOLOGIST CVX/VAG CYTO: NORMAL
CYTOLOGY CVX/VAG DOC CYTO: NORMAL
CYTOLOGY CVX/VAG DOC THIN PREP: NORMAL
DX ICD CODE: NORMAL
HPV GENOTYPE REFLEX: NORMAL
HPV I/H RISK 4 DNA CVX QL PROBE+SIG AMP: NEGATIVE
Lab: NORMAL
OTHER STN SPEC: NORMAL
STAT OF ADQ CVX/VAG CYTO-IMP: NORMAL

## 2023-09-28 RX ORDER — UBROGEPANT 100 MG/1
TABLET ORAL
Qty: 10 TABLET | Refills: 9 | OUTPATIENT
Start: 2023-09-28

## 2023-09-28 RX ORDER — BUTALBITAL, ACETAMINOPHEN AND CAFFEINE 50; 325; 40 MG/1; MG/1; MG/1
TABLET ORAL
Qty: 60 TABLET | Refills: 2 | Status: SHIPPED | OUTPATIENT
Start: 2023-09-28

## 2024-01-11 NOTE — PROGRESS NOTES
Problem Visit    Jennifer Chavez is a 48 y.o.  presenting for problem visit. Her main concern today is discussing pelvic pain, started . Located in right lower side. Better today, not as intense. Denies bleeding, abnormal discharge.   She also desires screening for STIs.     She denies history of problematic ovarian cysts, but does have history of fibroids, and is s/p an abdominal myomectomy in .    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/2022    1.2 x 1.8 cm cystic appearing lesion in the right aspect of the clivus and petrous apex, new since     Depression with anxiety 2018    Endometriosis     lenora ferreira md    Family history of ovarian cancer 2014    Fibroids     Gastritis 2018    EGD -  chapincito Weinberg md    GERD (gastroesophageal reflux disease)     reflux    H/O gastric bypass 2018    gastric bypass chapincito weinberg md - [facility]    Heart murmur     heart mumor     Hx of gastric bypass 10/21/2015    laparoscopic sleeve gastrectomy - inital weight 266 - Chapincito Weinberg md    Iron deficiency     Migraines 2011    Obesity (BMI 30-39.9)     Scoliosis of thoracic spine 2010    mri    Thyroid nodule        Past Surgical History:   Procedure Laterality Date    BREAST BIOPSY Left     incomplete. could not locate lump     SECTION      CHOLECYSTECTOMY  2013    lap niraj at [facility] [address]    GASTRIC BYPASS SURGERY      GI      HEENT      MYOMECTOMY  5/17/10    MYOMECTOMY 1-4,W/TOT 250GMS/<,ABD APPRCH  5/17/10    OTHER SURGICAL HISTORY      fibroids removed    OTHER SURGICAL HISTORY      cyst removed lip    OTHER SURGICAL HISTORY      tonsillectomy    OTHER SURGICAL HISTORY      pilonidal cyst       Family History   Problem

## 2024-01-12 ENCOUNTER — OFFICE VISIT (OUTPATIENT)
Age: 49
End: 2024-01-12

## 2024-01-12 VITALS
HEIGHT: 67 IN | SYSTOLIC BLOOD PRESSURE: 140 MMHG | WEIGHT: 207 LBS | BODY MASS INDEX: 32.49 KG/M2 | DIASTOLIC BLOOD PRESSURE: 100 MMHG

## 2024-01-12 DIAGNOSIS — R10.2 PELVIC PAIN: Primary | ICD-10-CM

## 2024-01-12 DIAGNOSIS — Z11.3 SCREEN FOR STD (SEXUALLY TRANSMITTED DISEASE): ICD-10-CM

## 2024-01-13 LAB
HBV SURFACE AG SER QL: 0.12 INDEX
HBV SURFACE AG SER QL: NEGATIVE
HCV AB SER IA-ACNC: 0.16 INDEX
HCV AB SERPL QL IA: NONREACTIVE
HIV 1+2 AB+HIV1 P24 AG SERPL QL IA: NONREACTIVE
HIV 1/2 RESULT COMMENT: NORMAL

## 2024-01-15 LAB — T PALLIDUM AB SER QL IA: NON REACTIVE

## 2024-01-16 LAB
C TRACH RRNA SPEC QL NAA+PROBE: POSITIVE
N GONORRHOEA RRNA SPEC QL NAA+PROBE: NEGATIVE
SPECIMEN SOURCE: ABNORMAL
T VAGINALIS RRNA SPEC QL NAA+PROBE: NEGATIVE

## 2024-01-17 RX ORDER — FLUCONAZOLE 150 MG/1
150 TABLET ORAL ONCE
Qty: 1 TABLET | Refills: 0 | Status: SHIPPED | OUTPATIENT
Start: 2024-01-17 | End: 2024-01-17

## 2024-01-17 RX ORDER — AZITHROMYCIN 500 MG/1
1000 TABLET, FILM COATED ORAL ONCE
Qty: 2 TABLET | Refills: 1 | Status: SHIPPED | OUTPATIENT
Start: 2024-01-17 | End: 2024-01-17

## 2024-01-17 RX ORDER — ACETAMINOPHEN AND CODEINE PHOSPHATE 120; 12 MG/5ML; MG/5ML
1 SOLUTION ORAL DAILY
Qty: 30 TABLET | Refills: 2 | Status: SHIPPED | OUTPATIENT
Start: 2024-01-17

## 2024-01-24 ENCOUNTER — TELEPHONE (OUTPATIENT)
Facility: CLINIC | Age: 49
End: 2024-01-24

## 2024-01-24 RX ORDER — AMLODIPINE BESYLATE 5 MG/1
5 TABLET ORAL DAILY
Qty: 90 TABLET | Refills: 3 | Status: SHIPPED | OUTPATIENT
Start: 2024-01-24

## 2024-01-24 NOTE — TELEPHONE ENCOUNTER
Patient sent you a [employer] message about her bp being 150/80  and she is concerned if she needs a bp med she is taking birth control pills

## 2024-01-25 ENCOUNTER — HOSPITAL ENCOUNTER (EMERGENCY)
Facility: HOSPITAL | Age: 49
Discharge: HOME | End: 2024-01-25
Attending: STUDENT IN AN ORGANIZED HEALTH CARE EDUCATION/TRAINING PROGRAM
Payer: COMMERCIAL

## 2024-01-25 VITALS
BODY MASS INDEX: 32.32 KG/M2 | RESPIRATION RATE: 14 BRPM | HEIGHT: 67 IN | TEMPERATURE: 98.7 F | SYSTOLIC BLOOD PRESSURE: 146 MMHG | DIASTOLIC BLOOD PRESSURE: 87 MMHG | OXYGEN SATURATION: 100 % | WEIGHT: 205.91 LBS | HEART RATE: 65 BPM

## 2024-01-25 DIAGNOSIS — R03.0 ELEVATED BLOOD PRESSURE READING: Primary | ICD-10-CM

## 2024-01-25 PROCEDURE — 99283 EMERGENCY DEPT VISIT LOW MDM: CPT

## 2024-01-25 PROCEDURE — 6370000000 HC RX 637 (ALT 250 FOR IP): Performed by: STUDENT IN AN ORGANIZED HEALTH CARE EDUCATION/TRAINING PROGRAM

## 2024-01-25 RX ORDER — AMLODIPINE BESYLATE 5 MG/1
5 TABLET ORAL
Status: COMPLETED | OUTPATIENT
Start: 2024-01-25 | End: 2024-01-25

## 2024-01-25 RX ADMIN — AMLODIPINE BESYLATE 5 MG: 5 TABLET ORAL at 09:00

## 2024-01-25 ASSESSMENT — LIFESTYLE VARIABLES
HOW OFTEN DO YOU HAVE A DRINK CONTAINING ALCOHOL: NEVER
HOW OFTEN DO YOU HAVE A DRINK CONTAINING ALCOHOL: NEVER
HOW MANY STANDARD DRINKS CONTAINING ALCOHOL DO YOU HAVE ON A TYPICAL DAY: PATIENT DOES NOT DRINK

## 2024-01-25 ASSESSMENT — PAIN SCALES - GENERAL: PAINLEVEL_OUTOF10: 7

## 2024-01-25 ASSESSMENT — PAIN DESCRIPTION - LOCATION: LOCATION: HEAD

## 2024-01-25 ASSESSMENT — PAIN - FUNCTIONAL ASSESSMENT: PAIN_FUNCTIONAL_ASSESSMENT: 0-10

## 2024-01-25 NOTE — ED NOTES
Discharge instructions reviewed with pt and copy given by this RN. Patient educated on follow up with PCP and is ambulatory from ED in no sign of distress or discomfort.

## 2024-01-25 NOTE — ED TRIAGE NOTES
Pt reports her BP has irving elevated for the past 2 weeks and she has had a headache for the past 3 days. Pt reports her PCP wrote her for 5mg Norvasc yesterday but she has not picked it up yet. Pt reports her BP was 167/102 this am.

## 2024-01-25 NOTE — DISCHARGE INSTRUCTIONS
Please  your antihypertensive medications and take them as prescribed, follow-up with primary care doctor return as needed or if worsening condition

## 2024-02-12 NOTE — ED PROVIDER NOTES
sensory deficit.      Motor: No weakness.      Coordination: Coordination normal.   Psychiatric:         Mood and Affect: Mood normal.         DIAGNOSTIC RESULTS     EKG: All EKG's are interpreted by the Emergency Department Physician who either signs or Co-signs this chart in the absence of a cardiologist.        RADIOLOGY:   Non-plain film images such as CT, Ultrasound and MRI are read by the radiologist. Plain radiographic images are visualized and preliminarily interpreted by the emergency physician with the below findings:        Interpretation per the Radiologist below, if available at the time of this note:    No orders to display        LABS:  Labs Reviewed - No data to display    All other labs were within normal range or not returned as of this dictation.    EMERGENCY DEPARTMENT COURSE and DIFFERENTIAL DIAGNOSIS/MDM:   Vitals:    Vitals:    01/25/24 0853 01/25/24 0854 01/25/24 0855 01/25/24 0900   BP:    (!) 146/87   Pulse:       Resp:       Temp:       TempSrc:       SpO2: 100% 99% 100%    Weight:       Height:               Medical Decision Making  Well-appearing 40-year-old female presents ED for evaluation of elevated blood pressure reading, and headache that she has had for about 3 days.  She has no focal neurologic deficits.  Her blood pressure is 146/87 she is afebrile, she is not tachycardic, her neurologic exam is completely normal.  She has been prescribed Norvasc but has not picked it up yet.  Came to the ED because her blood pressure was elevated 167 or 1 2 today.  Offered laboratory studies and CT head without imaging to evaluate for intracranial hemorrhage or hypertensive emergency.  Patient declined would like to receive her antihypertensive medications and follow-up with her PCP.  Patient was given 1 dose of amlodipine while in the ED, was encouraged to  her prescription.  Patient leaves ED no acute distress nontoxic appearance    Risk  Prescription drug

## 2024-02-20 ENCOUNTER — HOSPITAL ENCOUNTER (OUTPATIENT)
Facility: HOSPITAL | Age: 49
Discharge: HOME | End: 2024-02-23
Payer: COMMERCIAL

## 2024-02-20 DIAGNOSIS — Q01.9 ENCEPHALOCELE (HCC): ICD-10-CM

## 2024-02-20 PROCEDURE — 70553 MRI BRAIN STEM W/O & W/DYE: CPT

## 2024-02-20 PROCEDURE — A9577 INJ MULTIHANCE: HCPCS | Performed by: NEUROLOGICAL SURGERY

## 2024-02-20 PROCEDURE — 6360000004 HC RX CONTRAST MEDICATION: Performed by: NEUROLOGICAL SURGERY

## 2024-02-20 RX ADMIN — GADOBENATE DIMEGLUMINE 20 ML: 529 INJECTION, SOLUTION INTRAVENOUS at 11:00

## 2024-02-23 ENCOUNTER — OFFICE VISIT (OUTPATIENT)
Age: 49
End: 2024-02-23

## 2024-02-23 VITALS — DIASTOLIC BLOOD PRESSURE: 82 MMHG | BODY MASS INDEX: 33.36 KG/M2 | SYSTOLIC BLOOD PRESSURE: 126 MMHG | WEIGHT: 213 LBS

## 2024-02-23 DIAGNOSIS — Z11.3 SCREENING FOR STD (SEXUALLY TRANSMITTED DISEASE): Primary | ICD-10-CM

## 2024-02-23 NOTE — PROGRESS NOTES
Problem Visit    Jennifer Chavez is a 48 y.o.  presenting for problem visit. Her main concern today is having a test of cure done today. Patient recently tested positive for Chlamydia in January on .  The pain she was experiencing at that visit has improved.       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/2022    1.2 x 1.8 cm cystic appearing lesion in the right aspect of the clivus and petrous apex, new since     Depression with anxiety 2018    Endometriosis     lenora ferreira md    Family history of ovarian cancer 2014    Fibroids     Gastritis 2018    EGD -  chapincito Sandoval md    GERD (gastroesophageal reflux disease)     reflux    H/O gastric bypass 2018    gastric bypass chapincito sandoval md - [facility]    Heart murmur     heart mumor     Hx of gastric bypass 10/21/2015    laparoscopic sleeve gastrectomy - inital weight 266 - Chapincito Sandoval md    Iron deficiency     Migraines 2011    Obesity (BMI 30-39.9)     Scoliosis of thoracic spine 2010    mri    Thyroid nodule        Past Surgical History:   Procedure Laterality Date    BREAST BIOPSY Left     incomplete. could not locate lump     SECTION      CHOLECYSTECTOMY  2013    lap niraj at [facility] [address]    GASTRIC BYPASS SURGERY      GI      HEENT      MYOMECTOMY  5/17/10    MYOMECTOMY 1-4,W/TOT 250GMS/<,ABD APPRCH  5/17/10    OTHER SURGICAL HISTORY      fibroids removed    OTHER SURGICAL HISTORY      cyst removed lip    OTHER SURGICAL HISTORY      tonsillectomy    OTHER SURGICAL HISTORY      pilonidal cyst       Family History   Problem Relation Age of Onset    Other Sister         Johan Poole    Colon Cancer Father     Ovarian Cancer Mother     Breast Cancer Mother

## 2024-02-28 LAB
C TRACH RRNA SPEC QL NAA+PROBE: NEGATIVE
N GONORRHOEA RRNA SPEC QL NAA+PROBE: NEGATIVE
SPECIMEN SOURCE: NORMAL
T VAGINALIS RRNA SPEC QL NAA+PROBE: NEGATIVE

## 2024-03-11 ENCOUNTER — OFFICE VISIT (OUTPATIENT)
Age: 49
End: 2024-03-11
Payer: COMMERCIAL

## 2024-03-11 VITALS
HEIGHT: 67 IN | SYSTOLIC BLOOD PRESSURE: 128 MMHG | WEIGHT: 216 LBS | DIASTOLIC BLOOD PRESSURE: 80 MMHG | BODY MASS INDEX: 33.9 KG/M2

## 2024-03-11 DIAGNOSIS — Z30.430 ENCOUNTER FOR IUD INSERTION: Primary | ICD-10-CM

## 2024-03-11 LAB
HCG, PREGNANCY, URINE, POC: NEGATIVE
VALID INTERNAL CONTROL, POC: YES

## 2024-03-11 PROCEDURE — 58300 INSERT INTRAUTERINE DEVICE: CPT | Performed by: OBSTETRICS & GYNECOLOGY

## 2024-03-11 PROCEDURE — 81025 URINE PREGNANCY TEST: CPT | Performed by: OBSTETRICS & GYNECOLOGY

## 2024-03-11 NOTE — PROGRESS NOTES
cut to 2.5 centimeters. She experienced a mild amount of cramping.     Post Procedure Status:   She tolerated the procedure with mild discomfort. The patient was observed for 10 minutes after the insertion. There were no complications.   Patient was discharged in stable condition. She understands the IUD will not protect from sexually transmitted diseases including HIV. She also understands she may have heavier/irregular bleeding for for the first 3-6 months with her IUD but that periods generally diminish in amount thereafter. She also understands the IUD must be removed before 8 years from today.    She will contact us if she experiences  Any significant or increasing bleeding, pain, fever, shaking, chills or any other concerning signs or symptoms.  She will also contact us or seek care immediately if she is or thinks she is pregnant.     See order report for IUD lot number.     Pastora Marshall MD

## 2024-04-02 RX ORDER — ACETAMINOPHEN AND CODEINE PHOSPHATE 120; 12 MG/5ML; MG/5ML
1 SOLUTION ORAL DAILY
Qty: 84 TABLET | OUTPATIENT
Start: 2024-04-02

## 2024-04-11 ENCOUNTER — TELEPHONE (OUTPATIENT)
Age: 49
End: 2024-04-11

## 2024-04-11 NOTE — TELEPHONE ENCOUNTER
Patient called in, name and  verified. Patient is currently c/o some bleeding since her IUD was placed back on 3/11/2024. We discussed that this irregular bleeding can be normal for the first 3-6 mos when the IUD is first placed. She has been provided bleeding and pain precautions. Pt has expressed understanding.

## 2024-04-26 ENCOUNTER — OFFICE VISIT (OUTPATIENT)
Age: 49
End: 2024-04-26
Payer: COMMERCIAL

## 2024-04-26 VITALS
DIASTOLIC BLOOD PRESSURE: 72 MMHG | HEIGHT: 67 IN | BODY MASS INDEX: 34.69 KG/M2 | SYSTOLIC BLOOD PRESSURE: 120 MMHG | WEIGHT: 221 LBS

## 2024-04-26 DIAGNOSIS — Z30.431 IUD CHECK UP: Primary | ICD-10-CM

## 2024-04-26 PROCEDURE — 99213 OFFICE O/P EST LOW 20 MIN: CPT | Performed by: OBSTETRICS & GYNECOLOGY

## 2024-04-26 RX ORDER — ESTRADIOL 1 MG/1
1 TABLET ORAL DAILY
Qty: 14 TABLET | Refills: 0 | Status: SHIPPED | OUTPATIENT
Start: 2024-04-26

## 2024-04-26 SDOH — ECONOMIC STABILITY: FOOD INSECURITY: WITHIN THE PAST 12 MONTHS, THE FOOD YOU BOUGHT JUST DIDN'T LAST AND YOU DIDN'T HAVE MONEY TO GET MORE.: NEVER TRUE

## 2024-04-26 SDOH — ECONOMIC STABILITY: HOUSING INSECURITY
IN THE LAST 12 MONTHS, WAS THERE A TIME WHEN YOU DID NOT HAVE A STEADY PLACE TO SLEEP OR SLEPT IN A SHELTER (INCLUDING NOW)?: NO

## 2024-04-26 SDOH — ECONOMIC STABILITY: FOOD INSECURITY: WITHIN THE PAST 12 MONTHS, YOU WORRIED THAT YOUR FOOD WOULD RUN OUT BEFORE YOU GOT MONEY TO BUY MORE.: NEVER TRUE

## 2024-04-26 SDOH — ECONOMIC STABILITY: INCOME INSECURITY: HOW HARD IS IT FOR YOU TO PAY FOR THE VERY BASICS LIKE FOOD, HOUSING, MEDICAL CARE, AND HEATING?: NOT HARD AT ALL

## 2024-04-26 ASSESSMENT — PATIENT HEALTH QUESTIONNAIRE - PHQ9: DEPRESSION UNABLE TO ASSESS: FUNCTIONAL CAPACITY MOTIVATION LIMITS ACCURACY

## 2024-04-26 NOTE — PROGRESS NOTES
Chief Complaint   Patient presents with    IUD check     Being seen for IUD check. Does not like the spotting, has been having it each week. Using a pantyliner  Ob/Gyn Hx:    LMP - No LMP recorded. (Menstrual status: Chemically Induced).  Menses - 3/14/2024  Contraception - IUD 3/11/2024  STI - declines  SA - yes, male    Health Maintenance:  Last Pap: 2023 NIL, HPV negative    1. Have you been to the ER, urgent care clinic, or hospitalized since your last visit?No    2. Have you seen or consulted any other health care providers outside of the [facility] since your last visit? No    Patient declines chaperone.    Lucia Arzate LPN  
Disease Father     Seizures Sister         Since Childhood    GERD Son        Social History     Socioeconomic History    Marital status:      Spouse name: Not on file    Number of children: Not on file    Years of education: Not on file    Highest education level: Not on file   Occupational History    Not on file   Tobacco Use    Smoking status: Never    Smokeless tobacco: Never   Vaping Use    Vaping Use: Never used   Substance and Sexual Activity    Alcohol use: Yes     Alcohol/week: 1.0 standard drink of alcohol    Drug use: No    Sexual activity: Yes     Birth control/protection: Pill   Other Topics Concern    Not on file   Social History Narrative    Habits:  She is a never smoker.  Never drinker, and does not abuse drugs.    Social History:  The patient is  to her second  of 5 years duration and now    She has one child who is 8 years old, a son.  She is an LPN.  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  She was going to [employer] but she is considering going to [employer].      Family History:  Father is alive and well.  Mother  at age 56 of ventricular atrial fibrillation.  Othe    r medical problems included end stage renal disease on dialysis, diabetes, heart disease, obesity.  She has two sisters, alive and well.     Social Determinants of Health     Financial Resource Strain: Low Risk  (2024)    Overall Financial Resource Strain (CARDIA)     Difficulty of Paying Living Expenses: Not hard at all   Food Insecurity: No Food Insecurity (2024)    Hunger Vital Sign     Worried About Running Out of Food in the Last Year: Never true     Ran Out of Food in the Last Year: Never true   Transportation Needs: Unknown (2024)    PRAPARE - Transportation     Lack of Transportation (Medical): Not on file     Lack of Transportation (Non-Medical): No   Physical Activity: Not on file   Stress: Not on file   Social Connections:

## 2024-06-10 RX ORDER — SERTRALINE HYDROCHLORIDE 100 MG/1
TABLET, FILM COATED ORAL
Qty: 1 TABLET | Refills: 0 | OUTPATIENT
Start: 2024-06-10

## 2024-06-10 RX ORDER — BUSPIRONE HYDROCHLORIDE 7.5 MG/1
7.5 TABLET ORAL 3 TIMES DAILY
Qty: 1 TABLET | Refills: 0 | OUTPATIENT
Start: 2024-06-10

## 2024-06-25 ENCOUNTER — OFFICE VISIT (OUTPATIENT)
Facility: CLINIC | Age: 49
End: 2024-06-25
Payer: COMMERCIAL

## 2024-06-25 VITALS
SYSTOLIC BLOOD PRESSURE: 127 MMHG | BODY MASS INDEX: 35.46 KG/M2 | DIASTOLIC BLOOD PRESSURE: 74 MMHG | HEIGHT: 67 IN | HEART RATE: 73 BPM | WEIGHT: 225.9 LBS | RESPIRATION RATE: 18 BRPM | TEMPERATURE: 98.6 F | OXYGEN SATURATION: 97 %

## 2024-06-25 DIAGNOSIS — E78.5 DYSLIPIDEMIA: ICD-10-CM

## 2024-06-25 DIAGNOSIS — Z12.11 SCREEN FOR COLON CANCER: ICD-10-CM

## 2024-06-25 DIAGNOSIS — R01.1 HEART MURMUR: ICD-10-CM

## 2024-06-25 DIAGNOSIS — Z98.84 HX OF GASTRIC BYPASS: ICD-10-CM

## 2024-06-25 DIAGNOSIS — K21.9 GASTROESOPHAGEAL REFLUX DISEASE WITHOUT ESOPHAGITIS: ICD-10-CM

## 2024-06-25 DIAGNOSIS — Z00.00 PREVENTATIVE HEALTH CARE: Primary | ICD-10-CM

## 2024-06-25 DIAGNOSIS — E66.9 OBESITY (BMI 30-39.9): ICD-10-CM

## 2024-06-25 PROCEDURE — 99396 PREV VISIT EST AGE 40-64: CPT | Performed by: INTERNAL MEDICINE

## 2024-06-25 RX ORDER — BUSPIRONE HYDROCHLORIDE 7.5 MG/1
7.5 TABLET ORAL 3 TIMES DAILY
Qty: 270 TABLET | Refills: 3 | Status: SHIPPED | OUTPATIENT
Start: 2024-06-25

## 2024-06-25 RX ORDER — AMLODIPINE BESYLATE 5 MG/1
5 TABLET ORAL DAILY
Qty: 90 TABLET | Refills: 3 | Status: SHIPPED | OUTPATIENT
Start: 2024-06-25

## 2024-06-25 RX ORDER — SERTRALINE HYDROCHLORIDE 100 MG/1
100 TABLET, FILM COATED ORAL DAILY
Qty: 90 TABLET | Refills: 3 | Status: SHIPPED | OUTPATIENT
Start: 2024-06-25

## 2024-06-25 SDOH — ECONOMIC STABILITY: TRANSPORTATION INSECURITY
IN THE PAST 12 MONTHS, HAS THE LACK OF TRANSPORTATION KEPT YOU FROM MEDICAL APPOINTMENTS OR FROM GETTING MEDICATIONS?: NO

## 2024-06-25 SDOH — ECONOMIC STABILITY: FOOD INSECURITY: WITHIN THE PAST 12 MONTHS, THE FOOD YOU BOUGHT JUST DIDN'T LAST AND YOU DIDN'T HAVE MONEY TO GET MORE.: NEVER TRUE

## 2024-06-25 SDOH — ECONOMIC STABILITY: INCOME INSECURITY: IN THE LAST 12 MONTHS, WAS THERE A TIME WHEN YOU WERE NOT ABLE TO PAY THE MORTGAGE OR RENT ON TIME?: NO

## 2024-06-25 SDOH — HEALTH STABILITY: PHYSICAL HEALTH: ON AVERAGE, HOW MANY MINUTES DO YOU ENGAGE IN EXERCISE AT THIS LEVEL?: 30 MIN

## 2024-06-25 SDOH — HEALTH STABILITY: PHYSICAL HEALTH: ON AVERAGE, HOW MANY DAYS PER WEEK DO YOU ENGAGE IN MODERATE TO STRENUOUS EXERCISE (LIKE A BRISK WALK)?: 4 DAYS

## 2024-06-25 SDOH — ECONOMIC STABILITY: HOUSING INSECURITY: IN THE LAST 12 MONTHS, HOW MANY PLACES HAVE YOU LIVED?: 1

## 2024-06-25 SDOH — ECONOMIC STABILITY: FOOD INSECURITY: WITHIN THE PAST 12 MONTHS, YOU WORRIED THAT YOUR FOOD WOULD RUN OUT BEFORE YOU GOT MONEY TO BUY MORE.: NEVER TRUE

## 2024-06-25 SDOH — ECONOMIC STABILITY: TRANSPORTATION INSECURITY
IN THE PAST 12 MONTHS, HAS LACK OF TRANSPORTATION KEPT YOU FROM MEETINGS, WORK, OR FROM GETTING THINGS NEEDED FOR DAILY LIVING?: NO

## 2024-06-25 SDOH — ECONOMIC STABILITY: INCOME INSECURITY: HOW HARD IS IT FOR YOU TO PAY FOR THE VERY BASICS LIKE FOOD, HOUSING, MEDICAL CARE, AND HEATING?: NOT HARD AT ALL

## 2024-06-25 ASSESSMENT — PATIENT HEALTH QUESTIONNAIRE - PHQ9
4. FEELING TIRED OR HAVING LITTLE ENERGY: NOT AT ALL
1. LITTLE INTEREST OR PLEASURE IN DOING THINGS: NOT AT ALL
SUM OF ALL RESPONSES TO PHQ QUESTIONS 1-9: 0
9. THOUGHTS THAT YOU WOULD BE BETTER OFF DEAD, OR OF HURTING YOURSELF: NOT AT ALL
10. IF YOU CHECKED OFF ANY PROBLEMS, HOW DIFFICULT HAVE THESE PROBLEMS MADE IT FOR YOU TO DO YOUR WORK, TAKE CARE OF THINGS AT HOME, OR GET ALONG WITH OTHER PEOPLE: NOT DIFFICULT AT ALL
SUM OF ALL RESPONSES TO PHQ QUESTIONS 1-9: 0
7. TROUBLE CONCENTRATING ON THINGS, SUCH AS READING THE NEWSPAPER OR WATCHING TELEVISION: NOT AT ALL
1. LITTLE INTEREST OR PLEASURE IN DOING THINGS: NOT AT ALL
SUM OF ALL RESPONSES TO PHQ QUESTIONS 1-9: 0
SUM OF ALL RESPONSES TO PHQ QUESTIONS 1-9: 0
SUM OF ALL RESPONSES TO PHQ9 QUESTIONS 1 & 2: 0
SUM OF ALL RESPONSES TO PHQ QUESTIONS 1-9: 0
SUM OF ALL RESPONSES TO PHQ9 QUESTIONS 1 & 2: 0
SUM OF ALL RESPONSES TO PHQ QUESTIONS 1-9: 0
2. FEELING DOWN, DEPRESSED OR HOPELESS: NOT AT ALL
3. TROUBLE FALLING OR STAYING ASLEEP: NOT AT ALL
5. POOR APPETITE OR OVEREATING: NOT AT ALL
2. FEELING DOWN, DEPRESSED OR HOPELESS: NOT AT ALL
SUM OF ALL RESPONSES TO PHQ QUESTIONS 1-9: 0
8. MOVING OR SPEAKING SO SLOWLY THAT OTHER PEOPLE COULD HAVE NOTICED. OR THE OPPOSITE, BEING SO FIGETY OR RESTLESS THAT YOU HAVE BEEN MOVING AROUND A LOT MORE THAN USUAL: NOT AT ALL
SUM OF ALL RESPONSES TO PHQ QUESTIONS 1-9: 0
6. FEELING BAD ABOUT YOURSELF - OR THAT YOU ARE A FAILURE OR HAVE LET YOURSELF OR YOUR FAMILY DOWN: NOT AT ALL

## 2024-06-25 ASSESSMENT — SOCIAL DETERMINANTS OF HEALTH (SDOH)
HOW OFTEN DO YOU GET TOGETHER WITH FRIENDS OR RELATIVES?: MORE THAN THREE TIMES A WEEK
HOW OFTEN DO YOU ATTENT MEETINGS OF THE CLUB OR ORGANIZATION YOU BELONG TO?: NEVER
HOW OFTEN DO YOU ATTEND CHURCH OR RELIGIOUS SERVICES?: MORE THAN 4 TIMES PER YEAR
IN A TYPICAL WEEK, HOW MANY TIMES DO YOU TALK ON THE PHONE WITH FAMILY, FRIENDS, OR NEIGHBORS?: MORE THAN THREE TIMES A WEEK
DO YOU BELONG TO ANY CLUBS OR ORGANIZATIONS SUCH AS CHURCH GROUPS UNIONS, FRATERNAL OR ATHLETIC GROUPS, OR SCHOOL GROUPS?: NO

## 2024-06-25 ASSESSMENT — ANXIETY QUESTIONNAIRES
2. NOT BEING ABLE TO STOP OR CONTROL WORRYING: NOT AT ALL
6. BECOMING EASILY ANNOYED OR IRRITABLE: NOT AT ALL
IF YOU CHECKED OFF ANY PROBLEMS ON THIS QUESTIONNAIRE, HOW DIFFICULT HAVE THESE PROBLEMS MADE IT FOR YOU TO DO YOUR WORK, TAKE CARE OF THINGS AT HOME, OR GET ALONG WITH OTHER PEOPLE: NOT DIFFICULT AT ALL
GAD7 TOTAL SCORE: 0
1. FEELING NERVOUS, ANXIOUS, OR ON EDGE: NOT AT ALL
4. TROUBLE RELAXING: NOT AT ALL
3. WORRYING TOO MUCH ABOUT DIFFERENT THINGS: NOT AT ALL
7. FEELING AFRAID AS IF SOMETHING AWFUL MIGHT HAPPEN: NOT AT ALL
5. BEING SO RESTLESS THAT IT IS HARD TO SIT STILL: NOT AT ALL

## 2024-06-25 NOTE — PROGRESS NOTES
Chief Complaint   Patient presents with    Follow-up    Hypertension     Patient here for follow up high blood pressure. She reports she needs refills on her medications.      \"Have you been to the ER, urgent care clinic since your last visit?  Hospitalized since your last visit?\"    NO    “Have you seen or consulted any other health care providers outside of [facility] since your last visit?”    NO        “Have you had a colorectal cancer screening such as a colonoscopy/FIT/Cologuard?    NO    No colonoscopy on file  No cologuard on file  No FIT/FOBT on file   No flexible sigmoidoscopy on file         Click Here for Release of Records Request      
active  HEMATOLOGIC: no pathological lymph nodes palpated  MUSCULOSKELETAL: Extremities: no edema, pulse 1+   INTEGUMENT: No unusual rashes or suspicious skin lesions noted. Nails appear normal.  NEUROLOGIC: non-focal exam   MENTAL STATUS: alert and oriented, appropriate affect           ASSESSMENT:  1. Preventative health care    2. Obesity (BMI 30-39.9)    3. Hx of gastric bypass    4. Gastroesophageal reflux disease without esophagitis    5. Dyslipidemia    6. Heart murmur      This completes a preventive healthcare visit.  We encouraged physical activity 30 minutes five days a week and a heart healthy, weight reducing diet.    For obesity she is going to begin a GLP1 agonist.  She is requesting Wegov[employer] and states that her insurance company told her they would pay for it.    She is status post gastric bypass and is going to see the surgeon next week.    History of GERD.    Dyslipidemia will be checked with a lipid panel.    I heard a murmur I did not hear previously and will ask for an echocardiogram.    She apparently has an anemia and has a hematology appointment next week.    She will be back to see me in six months, sooner if needed.        I have discussed the diagnosis with the patient and the intended plan as seen in the  Orders.  The patient understands and agees with the plan.  The patient has   received an after visit summary and questions were answered concerning  future plans  Patient labs and/or xrays were reviewed  Past records were reviewed.    PLAN:  Orders Placed This Encounter   Procedures    OLIVER NILA DIGITAL SCREEN BILATERAL     Standing Status:   Future     Standing Expiration Date:   8/25/2025    TSH     Standing Status:   Future     Number of Occurrences:   1     Standing Expiration Date:   6/25/2025    Urinalysis with Microscopic     Standing Status:   Future     Number of Occurrences:   1     Standing Expiration Date:   6/25/2025     Order Specific Question:

## 2024-06-26 LAB
25(OH)D3 SERPL-MCNC: 65 NG/ML (ref 30–100)
ALBUMIN SERPL-MCNC: 3.9 G/DL (ref 3.5–5)
ALBUMIN/GLOB SERPL: 1.1 (ref 1.1–2.2)
ALP SERPL-CCNC: 124 U/L (ref 45–117)
ALT SERPL-CCNC: 40 U/L (ref 12–78)
ANION GAP SERPL CALC-SCNC: 5 MMOL/L (ref 5–15)
APPEARANCE UR: CLEAR
AST SERPL-CCNC: 32 U/L (ref 15–37)
BACTERIA URNS QL MICRO: NEGATIVE /HPF
BASOPHILS # BLD: 0.1 K/UL (ref 0–0.1)
BASOPHILS NFR BLD: 2 % (ref 0–1)
BILIRUB SERPL-MCNC: 0.2 MG/DL (ref 0.2–1)
BILIRUB UR QL: NEGATIVE
BUN SERPL-MCNC: 14 MG/DL (ref 6–20)
BUN/CREAT SERPL: 21 (ref 12–20)
CALCIUM SERPL-MCNC: 8.7 MG/DL (ref 8.5–10.1)
CHLORIDE SERPL-SCNC: 109 MMOL/L (ref 97–108)
CHOLEST SERPL-MCNC: 210 MG/DL
CO2 SERPL-SCNC: 24 MMOL/L (ref 21–32)
COLOR UR: NORMAL
CREAT SERPL-MCNC: 0.68 MG/DL (ref 0.55–1.02)
DIFFERENTIAL METHOD BLD: ABNORMAL
EOSINOPHIL # BLD: 0.2 K/UL (ref 0–0.4)
EOSINOPHIL NFR BLD: 4 % (ref 0–7)
EPITH CASTS URNS QL MICRO: NORMAL /LPF
ERYTHROCYTE [DISTWIDTH] IN BLOOD BY AUTOMATED COUNT: 18.8 % (ref 11.5–14.5)
GLOBULIN SER CALC-MCNC: 3.4 G/DL (ref 2–4)
GLUCOSE SERPL-MCNC: 93 MG/DL (ref 65–100)
GLUCOSE UR STRIP.AUTO-MCNC: NEGATIVE MG/DL
HCT VFR BLD AUTO: 30.5 % (ref 35–47)
HDLC SERPL-MCNC: 82 MG/DL
HDLC SERPL: 2.6 (ref 0–5)
HGB BLD-MCNC: 8.9 G/DL (ref 11.5–16)
HGB UR QL STRIP: NEGATIVE
HYALINE CASTS URNS QL MICRO: NORMAL /LPF (ref 0–5)
IMM GRANULOCYTES # BLD AUTO: 0 K/UL (ref 0–0.04)
IMM GRANULOCYTES NFR BLD AUTO: 0 % (ref 0–0.5)
KETONES UR QL STRIP.AUTO: NEGATIVE MG/DL
LDLC SERPL CALC-MCNC: 109.8 MG/DL (ref 0–100)
LEUKOCYTE ESTERASE UR QL STRIP.AUTO: NEGATIVE
LYMPHOCYTES # BLD: 2 K/UL (ref 0.8–3.5)
LYMPHOCYTES NFR BLD: 40 % (ref 12–49)
MCH RBC QN AUTO: 23.3 PG (ref 26–34)
MCHC RBC AUTO-ENTMCNC: 29.2 G/DL (ref 30–36.5)
MCV RBC AUTO: 79.8 FL (ref 80–99)
MONOCYTES # BLD: 0.6 K/UL (ref 0–1)
MONOCYTES NFR BLD: 11 % (ref 5–13)
NEUTS SEG # BLD: 2.1 K/UL (ref 1.8–8)
NEUTS SEG NFR BLD: 43 % (ref 32–75)
NITRITE UR QL STRIP.AUTO: NEGATIVE
NRBC # BLD: 0 K/UL (ref 0–0.01)
NRBC BLD-RTO: 0 PER 100 WBC
PH UR STRIP: 6 (ref 5–8)
PLATELET # BLD AUTO: 261 K/UL (ref 150–400)
PMV BLD AUTO: 11.3 FL (ref 8.9–12.9)
POTASSIUM SERPL-SCNC: 4.8 MMOL/L (ref 3.5–5.1)
PROT SERPL-MCNC: 7.3 G/DL (ref 6.4–8.2)
PROT UR STRIP-MCNC: NEGATIVE MG/DL
RBC # BLD AUTO: 3.82 M/UL (ref 3.8–5.2)
RBC #/AREA URNS HPF: NORMAL /HPF (ref 0–5)
SODIUM SERPL-SCNC: 138 MMOL/L (ref 136–145)
SP GR UR REFRACTOMETRY: 1.02 (ref 1–1.03)
TRIGL SERPL-MCNC: 91 MG/DL
TSH SERPL DL<=0.05 MIU/L-ACNC: 2.31 UIU/ML (ref 0.36–3.74)
UROBILINOGEN UR QL STRIP.AUTO: 0.2 EU/DL (ref 0.2–1)
VIT B12 SERPL-MCNC: 374 PG/ML (ref 193–986)
VLDLC SERPL CALC-MCNC: 18.2 MG/DL
WBC # BLD AUTO: 4.9 K/UL (ref 3.6–11)
WBC URNS QL MICRO: NORMAL /HPF (ref 0–4)

## 2024-07-02 ENCOUNTER — HOSPITAL ENCOUNTER (OUTPATIENT)
Facility: HOSPITAL | Age: 49
Discharge: HOME | End: 2024-07-04
Payer: COMMERCIAL

## 2024-07-02 DIAGNOSIS — R01.1 HEART MURMUR: ICD-10-CM

## 2024-07-02 PROBLEM — I35.1 MILD AORTIC INSUFFICIENCY: Status: ACTIVE | Noted: 2024-07-02

## 2024-07-02 LAB
ECHO AO ASC DIAM: 2.7 CM
ECHO AO ROOT DIAM: 3.1 CM
ECHO AR MAX VEL PISA: 4.4 M/S
ECHO AV AREA PEAK VELOCITY: 1.9 CM2
ECHO AV AREA VTI: 1.9 CM2
ECHO AV MEAN GRADIENT: 7 MMHG
ECHO AV MEAN VELOCITY: 1.2 M/S
ECHO AV PEAK GRADIENT: 12 MMHG
ECHO AV PEAK VELOCITY: 1.7 M/S
ECHO AV REGURGITANT PHT: 577 MS
ECHO AV VELOCITY RATIO: 0.59
ECHO AV VTI: 42.2 CM
ECHO EST RA PRESSURE: 3 MMHG
ECHO LA AREA 4C: 15.5 CM2
ECHO LA DIAMETER: 3.4 CM
ECHO LA MAJOR AXIS: 5.4 CM
ECHO LA TO AORTIC ROOT RATIO: 1.1
ECHO LA VOL MOD A4C: 35 ML (ref 22–52)
ECHO LV E' LATERAL VELOCITY: 16 CM/S
ECHO LV E' SEPTAL VELOCITY: 9 CM/S
ECHO LV EDV A4C: 124 ML
ECHO LV EJECTION FRACTION A4C: 44 %
ECHO LV ESV A4C: 69 ML
ECHO LV FRACTIONAL SHORTENING: 36 % (ref 28–44)
ECHO LV INTERNAL DIMENSION DIASTOLIC: 5 CM (ref 3.9–5.3)
ECHO LV INTERNAL DIMENSION SYSTOLIC: 3.2 CM
ECHO LV IVSD: 1.1 CM (ref 0.6–0.9)
ECHO LV MASS 2D: 194.4 G (ref 67–162)
ECHO LV POSTERIOR WALL DIASTOLIC: 1 CM (ref 0.6–0.9)
ECHO LV RELATIVE WALL THICKNESS RATIO: 0.4
ECHO LVOT AREA: 3.1 CM2
ECHO LVOT AV VTI INDEX: 0.61
ECHO LVOT DIAM: 2 CM
ECHO LVOT MEAN GRADIENT: 2 MMHG
ECHO LVOT PEAK GRADIENT: 4 MMHG
ECHO LVOT PEAK VELOCITY: 1 M/S
ECHO LVOT SV: 81 ML
ECHO LVOT VTI: 25.8 CM
ECHO MV A VELOCITY: 0.84 M/S
ECHO MV AREA VTI: 2.2 CM2
ECHO MV E DECELERATION TIME (DT): 251 MS
ECHO MV E VELOCITY: 1.12 M/S
ECHO MV E/A RATIO: 1.33
ECHO MV E/E' LATERAL: 7
ECHO MV E/E' RATIO (AVERAGED): 9.72
ECHO MV E/E' SEPTAL: 12.44
ECHO MV LVOT VTI INDEX: 1.42
ECHO MV MAX VELOCITY: 1.1 M/S
ECHO MV MEAN GRADIENT: 2 MMHG
ECHO MV MEAN VELOCITY: 0.7 M/S
ECHO MV PEAK GRADIENT: 5 MMHG
ECHO MV VTI: 36.7 CM
ECHO PV MAX VELOCITY: 0.7 M/S
ECHO PV PEAK GRADIENT: 2 MMHG
ECHO RA AREA 4C: 15.5 CM2
ECHO RA VOLUME: 37 ML
ECHO RIGHT VENTRICULAR SYSTOLIC PRESSURE (RVSP): 12 MMHG
ECHO RV BASAL DIMENSION: 3.5 CM
ECHO RV FREE WALL PEAK S': 12 CM/S
ECHO RV TAPSE: 2.6 CM (ref 1.7–?)
ECHO TV REGURGITANT MAX VELOCITY: 1.48 M/S
ECHO TV REGURGITANT PEAK GRADIENT: 9 MMHG

## 2024-07-02 PROCEDURE — 93306 TTE W/DOPPLER COMPLETE: CPT

## 2024-08-07 ENCOUNTER — HOSPITAL ENCOUNTER (OUTPATIENT)
Facility: HOSPITAL | Age: 49
Discharge: HOME | End: 2024-08-10

## 2024-08-07 DIAGNOSIS — Z00.00 PREVENTATIVE HEALTH CARE: ICD-10-CM

## 2024-09-10 ENCOUNTER — HOSPITAL ENCOUNTER (OUTPATIENT)
Age: 49
Discharge: HOME | End: 2024-09-13
Payer: COMMERCIAL

## 2024-09-10 VITALS — BODY MASS INDEX: 35.31 KG/M2 | WEIGHT: 225 LBS | HEIGHT: 67 IN

## 2024-09-10 PROCEDURE — 77063 BREAST TOMOSYNTHESIS BI: CPT

## 2025-06-30 RX ORDER — SERTRALINE HYDROCHLORIDE 100 MG/1
100 TABLET, FILM COATED ORAL DAILY
Qty: 30 TABLET | Refills: 0 | Status: SHIPPED | OUTPATIENT
Start: 2025-06-30

## 2025-06-30 RX ORDER — BUSPIRONE HYDROCHLORIDE 7.5 MG/1
7.5 TABLET ORAL 3 TIMES DAILY
Qty: 90 TABLET | Refills: 0 | Status: SHIPPED | OUTPATIENT
Start: 2025-06-30

## 2025-07-26 RX ORDER — SERTRALINE HYDROCHLORIDE 100 MG/1
100 TABLET, FILM COATED ORAL DAILY
Qty: 30 TABLET | Refills: 1 | Status: SHIPPED | OUTPATIENT
Start: 2025-07-26